# Patient Record
Sex: FEMALE | Race: WHITE
[De-identification: names, ages, dates, MRNs, and addresses within clinical notes are randomized per-mention and may not be internally consistent; named-entity substitution may affect disease eponyms.]

---

## 2017-05-12 ENCOUNTER — APPOINTMENT (OUTPATIENT)
Dept: ELECTROPHYSIOLOGY | Facility: CLINIC | Age: 67
End: 2017-05-12

## 2020-11-29 ENCOUNTER — TRANSCRIPTION ENCOUNTER (OUTPATIENT)
Age: 70
End: 2020-11-29

## 2021-02-16 ENCOUNTER — NON-APPOINTMENT (OUTPATIENT)
Age: 71
End: 2021-02-16

## 2021-04-29 ENCOUNTER — EMERGENCY (EMERGENCY)
Facility: HOSPITAL | Age: 71
LOS: 0 days | Discharge: HOME | End: 2021-04-29
Attending: EMERGENCY MEDICINE
Payer: COMMERCIAL

## 2021-04-29 VITALS
HEART RATE: 64 BPM | OXYGEN SATURATION: 100 % | DIASTOLIC BLOOD PRESSURE: 75 MMHG | SYSTOLIC BLOOD PRESSURE: 111 MMHG | TEMPERATURE: 98 F | RESPIRATION RATE: 21 BRPM

## 2021-04-29 VITALS
HEIGHT: 66 IN | TEMPERATURE: 98 F | DIASTOLIC BLOOD PRESSURE: 88 MMHG | SYSTOLIC BLOOD PRESSURE: 156 MMHG | HEART RATE: 79 BPM | OXYGEN SATURATION: 100 % | RESPIRATION RATE: 28 BRPM | WEIGHT: 182.98 LBS

## 2021-04-29 DIAGNOSIS — Z95.0 PRESENCE OF CARDIAC PACEMAKER: ICD-10-CM

## 2021-04-29 DIAGNOSIS — E78.5 HYPERLIPIDEMIA, UNSPECIFIED: ICD-10-CM

## 2021-04-29 DIAGNOSIS — I50.9 HEART FAILURE, UNSPECIFIED: ICD-10-CM

## 2021-04-29 DIAGNOSIS — Z86.16 PERSONAL HISTORY OF COVID-19: ICD-10-CM

## 2021-04-29 DIAGNOSIS — R06.02 SHORTNESS OF BREATH: ICD-10-CM

## 2021-04-29 DIAGNOSIS — I11.0 HYPERTENSIVE HEART DISEASE WITH HEART FAILURE: ICD-10-CM

## 2021-04-29 DIAGNOSIS — R05 COUGH: ICD-10-CM

## 2021-04-29 LAB
ALBUMIN SERPL ELPH-MCNC: 3.6 G/DL — SIGNIFICANT CHANGE UP (ref 3.3–5)
ALP SERPL-CCNC: 119 U/L — SIGNIFICANT CHANGE UP (ref 40–120)
ALT FLD-CCNC: 32 U/L — SIGNIFICANT CHANGE UP (ref 12–78)
ANION GAP SERPL CALC-SCNC: 6 MMOL/L — SIGNIFICANT CHANGE UP (ref 5–17)
AST SERPL-CCNC: 32 U/L — SIGNIFICANT CHANGE UP (ref 15–37)
BASOPHILS # BLD AUTO: 0.05 K/UL — SIGNIFICANT CHANGE UP (ref 0–0.2)
BASOPHILS NFR BLD AUTO: 1 % — SIGNIFICANT CHANGE UP (ref 0–2)
BILIRUB SERPL-MCNC: 0.9 MG/DL — SIGNIFICANT CHANGE UP (ref 0.2–1.2)
BUN SERPL-MCNC: 19 MG/DL — SIGNIFICANT CHANGE UP (ref 7–23)
CALCIUM SERPL-MCNC: 9.6 MG/DL — SIGNIFICANT CHANGE UP (ref 8.5–10.1)
CHLORIDE SERPL-SCNC: 109 MMOL/L — HIGH (ref 96–108)
CO2 SERPL-SCNC: 26 MMOL/L — SIGNIFICANT CHANGE UP (ref 22–31)
CREAT SERPL-MCNC: 1.26 MG/DL — SIGNIFICANT CHANGE UP (ref 0.5–1.3)
EOSINOPHIL # BLD AUTO: 0.12 K/UL — SIGNIFICANT CHANGE UP (ref 0–0.5)
EOSINOPHIL NFR BLD AUTO: 2.5 % — SIGNIFICANT CHANGE UP (ref 0–6)
GLUCOSE SERPL-MCNC: 110 MG/DL — HIGH (ref 70–99)
HCT VFR BLD CALC: 37.5 % — SIGNIFICANT CHANGE UP (ref 34.5–45)
HGB BLD-MCNC: 12.5 G/DL — SIGNIFICANT CHANGE UP (ref 11.5–15.5)
IMM GRANULOCYTES NFR BLD AUTO: 0.2 % — SIGNIFICANT CHANGE UP (ref 0–1.5)
LYMPHOCYTES # BLD AUTO: 1.25 K/UL — SIGNIFICANT CHANGE UP (ref 1–3.3)
LYMPHOCYTES # BLD AUTO: 25.9 % — SIGNIFICANT CHANGE UP (ref 13–44)
MAGNESIUM SERPL-MCNC: 2 MG/DL — SIGNIFICANT CHANGE UP (ref 1.6–2.6)
MCHC RBC-ENTMCNC: 29.5 PG — SIGNIFICANT CHANGE UP (ref 27–34)
MCHC RBC-ENTMCNC: 33.3 GM/DL — SIGNIFICANT CHANGE UP (ref 32–36)
MCV RBC AUTO: 88.4 FL — SIGNIFICANT CHANGE UP (ref 80–100)
MONOCYTES # BLD AUTO: 0.36 K/UL — SIGNIFICANT CHANGE UP (ref 0–0.9)
MONOCYTES NFR BLD AUTO: 7.5 % — SIGNIFICANT CHANGE UP (ref 2–14)
NEUTROPHILS # BLD AUTO: 3.04 K/UL — SIGNIFICANT CHANGE UP (ref 1.8–7.4)
NEUTROPHILS NFR BLD AUTO: 62.9 % — SIGNIFICANT CHANGE UP (ref 43–77)
NT-PROBNP SERPL-SCNC: 618 PG/ML — HIGH (ref 0–125)
PLATELET # BLD AUTO: 204 K/UL — SIGNIFICANT CHANGE UP (ref 150–400)
POTASSIUM SERPL-MCNC: 3.9 MMOL/L — SIGNIFICANT CHANGE UP (ref 3.5–5.3)
POTASSIUM SERPL-SCNC: 3.9 MMOL/L — SIGNIFICANT CHANGE UP (ref 3.5–5.3)
PROT SERPL-MCNC: 7.2 GM/DL — SIGNIFICANT CHANGE UP (ref 6–8.3)
RAPID RVP RESULT: SIGNIFICANT CHANGE UP
RBC # BLD: 4.24 M/UL — SIGNIFICANT CHANGE UP (ref 3.8–5.2)
RBC # FLD: 13 % — SIGNIFICANT CHANGE UP (ref 10.3–14.5)
SARS-COV-2 RNA SPEC QL NAA+PROBE: SIGNIFICANT CHANGE UP
SODIUM SERPL-SCNC: 141 MMOL/L — SIGNIFICANT CHANGE UP (ref 135–145)
TROPONIN I SERPL-MCNC: 0.04 NG/ML — SIGNIFICANT CHANGE UP (ref 0.01–0.04)
TROPONIN I SERPL-MCNC: <0.015 NG/ML — SIGNIFICANT CHANGE UP (ref 0.01–0.04)
WBC # BLD: 4.83 K/UL — SIGNIFICANT CHANGE UP (ref 3.8–10.5)
WBC # FLD AUTO: 4.83 K/UL — SIGNIFICANT CHANGE UP (ref 3.8–10.5)

## 2021-04-29 PROCEDURE — 93005 ELECTROCARDIOGRAM TRACING: CPT

## 2021-04-29 PROCEDURE — 71045 X-RAY EXAM CHEST 1 VIEW: CPT | Mod: 26

## 2021-04-29 PROCEDURE — 83880 ASSAY OF NATRIURETIC PEPTIDE: CPT

## 2021-04-29 PROCEDURE — 83735 ASSAY OF MAGNESIUM: CPT

## 2021-04-29 PROCEDURE — 80053 COMPREHEN METABOLIC PANEL: CPT

## 2021-04-29 PROCEDURE — 96374 THER/PROPH/DIAG INJ IV PUSH: CPT

## 2021-04-29 PROCEDURE — 99285 EMERGENCY DEPT VISIT HI MDM: CPT

## 2021-04-29 PROCEDURE — 85025 COMPLETE CBC W/AUTO DIFF WBC: CPT

## 2021-04-29 PROCEDURE — 84484 ASSAY OF TROPONIN QUANT: CPT

## 2021-04-29 PROCEDURE — 93010 ELECTROCARDIOGRAM REPORT: CPT

## 2021-04-29 PROCEDURE — 99284 EMERGENCY DEPT VISIT MOD MDM: CPT | Mod: 25

## 2021-04-29 PROCEDURE — 71045 X-RAY EXAM CHEST 1 VIEW: CPT

## 2021-04-29 PROCEDURE — 36415 COLL VENOUS BLD VENIPUNCTURE: CPT

## 2021-04-29 PROCEDURE — 0225U NFCT DS DNA&RNA 21 SARSCOV2: CPT

## 2021-04-29 RX ORDER — FUROSEMIDE 40 MG
40 TABLET ORAL ONCE
Refills: 0 | Status: COMPLETED | OUTPATIENT
Start: 2021-04-29 | End: 2021-04-29

## 2021-04-29 RX ADMIN — Medication 40 MILLIGRAM(S): at 11:07

## 2021-04-29 NOTE — ED PROVIDER NOTE - CARE PROVIDER_API CALL
Paul Dodd J  CARDIOVASCULAR DISEASE  [address], [address]  [place] [address]  Phone: [phone]  Fax: [phone]  Follow Up Time: 1-3 Days

## 2021-04-29 NOTE — ED PROVIDER NOTE - OBJECTIVE STATEMENT
71 y/o female with a PMHx of COVID in December 2020 not requiring hospitalization, CHF, HTN, HLD, pacemaker, presents to the ED BIBA from home c/o SOB starting this morning. Pt on Torsemide 20mg, 325mg ASA and Plavix. Pt compliant with meds and took Torsemide this morning. Reports she was in [place] yesterday and had pretzels. +cough. Denies CP, fevers. No other complaints at this time. 69 y/o female with a PMHx of COVID in December 2020 not requiring hospitalization, CHF, HTN, HLD, pacemaker, presents to the ED BIBA from home c/o SOB starting this morning. Pt on Torsemide 20mg, 325mg ASA and Plavix. Pt compliant with meds and took Torsemide this morning. Reports she was in [place] yesterday and had pretzels. +cough. Denies CP, fevers. Received second COVID vaccine 2 weeks ago. No other complaints at this time. PCP/Cardio: Dr. Dodd.

## 2021-04-29 NOTE — ED PROVIDER NOTE - PMH
CHF (congestive heart failure)    COVID-19    HLD (hyperlipidemia)    HTN (hypertension)    Pacemaker

## 2021-04-29 NOTE — ED PROVIDER NOTE - NSFOLLOWUPINSTRUCTIONS_ED_ALL_ED_FT
Congestive Heart Failure (CHF)    Congestive heart failure is a chronic condition in which the heart has trouble pumping blood. In some cases of heart failure, fluid may back up into your lungs or you may have swelling (edema) in your lower legs. There are many causes of heart failure including high blood pressure, coronary artery disease, abnormal heart valves, heart muscle disease, lung disease, diabetes, etc. Symptoms include shortness of breath with activity or when lying flat, cough, swelling of the legs, fatigue, or increased urination during the night.     Treatment is aimed at managing the symptoms of heart failure and may include lifestyle changes, medications, or surgical procedures. Take medicines only as directed by your health care provider and do not stop unless instructed to do so. Eat heart-healthy foods with low or no trans/saturated fats, cholesterol and salt. Weigh yourself every day for early recognition of fluid accumulation.    SEEK IMMEDIATE MEDICAL CARE IF YOU HAVE ANY OF THE FOLLOWING SYMPTOMS: shortness of breath, change in mental status, chest pain, lightheadedness/dizziness/fainting, or worsening of symptoms including not being able to conduct normal physical activity.

## 2021-04-29 NOTE — ED PROVIDER NOTE - PATIENT PORTAL LINK FT
You can access the FollowMyHealth Patient Portal offered by [facility] by registering at the following website: [email or web address]. By joining [facility] FollowMyHealth portal, you will also be able to view your health information using other applications (apps) compatible with our system.

## 2021-04-29 NOTE — ED ADULT TRIAGE NOTE - CHIEF COMPLAINT QUOTE
Pt BIBA with report of SOB at home. Pt reports hx of CHF. Tachypneic with O2 sat 100% on NRB mask. RN notified and pt to main 17 for further evaluation.

## 2021-04-29 NOTE — ED PROVIDER NOTE - CLINICAL SUMMARY MEDICAL DECISION MAKING FREE TEXT BOX
Suspect CHF exacerbation. Plan: cardiac workup including BNP, RVP, give extra dose of diuretic, consult Dr. Dodd and reassess.

## 2021-04-29 NOTE — ED ADULT NURSE NOTE - OBJECTIVE STATEMENT
pt presents to ed via ems from home for progressively worsening SOB x 1 day. pt has hx of chf on torsemide. pt reports having covid in december and is now fully vaccinated from covid. pt initially on non rebreather, titrated down to 3L nasal canula with good results. bp normotensive. ekg done , placed on monitor. pt a&ox4, no other complaints

## 2021-11-12 ENCOUNTER — INPATIENT (INPATIENT)
Facility: HOSPITAL | Age: 71
LOS: 1 days | Discharge: HOME | DRG: 309 | End: 2021-11-14
Attending: HOSPITALIST | Admitting: INTERNAL MEDICINE
Payer: COMMERCIAL

## 2021-11-12 VITALS — HEIGHT: 66 IN | WEIGHT: 179.9 LBS

## 2021-11-12 DIAGNOSIS — E78.5 HYPERLIPIDEMIA, UNSPECIFIED: ICD-10-CM

## 2021-11-12 DIAGNOSIS — Z79.02 LONG TERM (CURRENT) USE OF ANTITHROMBOTICS/ANTIPLATELETS: ICD-10-CM

## 2021-11-12 DIAGNOSIS — I48.91 UNSPECIFIED ATRIAL FIBRILLATION: ICD-10-CM

## 2021-11-12 DIAGNOSIS — I11.0 HYPERTENSIVE HEART DISEASE WITH HEART FAILURE: ICD-10-CM

## 2021-11-12 DIAGNOSIS — Z91.018 ALLERGY TO OTHER FOODS: ICD-10-CM

## 2021-11-12 DIAGNOSIS — I25.10 ATHEROSCLEROTIC HEART DISEASE OF NATIVE CORONARY ARTERY WITHOUT ANGINA PECTORIS: ICD-10-CM

## 2021-11-12 DIAGNOSIS — Z79.82 LONG TERM (CURRENT) USE OF ASPIRIN: ICD-10-CM

## 2021-11-12 DIAGNOSIS — Z95.5 PRESENCE OF CORONARY ANGIOPLASTY IMPLANT AND GRAFT: ICD-10-CM

## 2021-11-12 DIAGNOSIS — Z95.810 PRESENCE OF AUTOMATIC (IMPLANTABLE) CARDIAC DEFIBRILLATOR: ICD-10-CM

## 2021-11-12 DIAGNOSIS — I50.22 CHRONIC SYSTOLIC (CONGESTIVE) HEART FAILURE: ICD-10-CM

## 2021-11-12 DIAGNOSIS — I25.2 OLD MYOCARDIAL INFARCTION: ICD-10-CM

## 2021-11-12 DIAGNOSIS — Z86.16 PERSONAL HISTORY OF COVID-19: ICD-10-CM

## 2021-11-12 LAB
APPEARANCE UR: CLEAR — SIGNIFICANT CHANGE UP
BASOPHILS # BLD AUTO: 0.07 K/UL — SIGNIFICANT CHANGE UP (ref 0–0.2)
BASOPHILS NFR BLD AUTO: 1.2 % — SIGNIFICANT CHANGE UP (ref 0–2)
BILIRUB UR-MCNC: NEGATIVE — SIGNIFICANT CHANGE UP
COLOR SPEC: YELLOW — SIGNIFICANT CHANGE UP
DIFF PNL FLD: NEGATIVE — SIGNIFICANT CHANGE UP
EOSINOPHIL # BLD AUTO: 0.11 K/UL — SIGNIFICANT CHANGE UP (ref 0–0.5)
EOSINOPHIL NFR BLD AUTO: 1.9 % — SIGNIFICANT CHANGE UP (ref 0–6)
GLUCOSE UR QL: NEGATIVE MG/DL — SIGNIFICANT CHANGE UP
HCT VFR BLD CALC: 36.7 % — SIGNIFICANT CHANGE UP (ref 34.5–45)
HGB BLD-MCNC: 12.3 G/DL — SIGNIFICANT CHANGE UP (ref 11.5–15.5)
IMM GRANULOCYTES NFR BLD AUTO: 0.2 % — SIGNIFICANT CHANGE UP (ref 0–1.5)
KETONES UR-MCNC: NEGATIVE — SIGNIFICANT CHANGE UP
LEUKOCYTE ESTERASE UR-ACNC: NEGATIVE — SIGNIFICANT CHANGE UP
LYMPHOCYTES # BLD AUTO: 1.26 K/UL — SIGNIFICANT CHANGE UP (ref 1–3.3)
LYMPHOCYTES # BLD AUTO: 21.5 % — SIGNIFICANT CHANGE UP (ref 13–44)
MCHC RBC-ENTMCNC: 30.8 PG — SIGNIFICANT CHANGE UP (ref 27–34)
MCHC RBC-ENTMCNC: 33.5 GM/DL — SIGNIFICANT CHANGE UP (ref 32–36)
MCV RBC AUTO: 92 FL — SIGNIFICANT CHANGE UP (ref 80–100)
MONOCYTES # BLD AUTO: 0.44 K/UL — SIGNIFICANT CHANGE UP (ref 0–0.9)
MONOCYTES NFR BLD AUTO: 7.5 % — SIGNIFICANT CHANGE UP (ref 2–14)
NEUTROPHILS # BLD AUTO: 3.96 K/UL — SIGNIFICANT CHANGE UP (ref 1.8–7.4)
NEUTROPHILS NFR BLD AUTO: 67.7 % — SIGNIFICANT CHANGE UP (ref 43–77)
NITRITE UR-MCNC: NEGATIVE — SIGNIFICANT CHANGE UP
PH UR: 5 — SIGNIFICANT CHANGE UP (ref 5–8)
PLATELET # BLD AUTO: 193 K/UL — SIGNIFICANT CHANGE UP (ref 150–400)
PROT UR-MCNC: NEGATIVE MG/DL — SIGNIFICANT CHANGE UP
RBC # BLD: 3.99 M/UL — SIGNIFICANT CHANGE UP (ref 3.8–5.2)
RBC # FLD: 13.4 % — SIGNIFICANT CHANGE UP (ref 10.3–14.5)
SP GR SPEC: 1 — LOW (ref 1.01–1.02)
UROBILINOGEN FLD QL: NEGATIVE MG/DL — SIGNIFICANT CHANGE UP
WBC # BLD: 5.85 K/UL — SIGNIFICANT CHANGE UP (ref 3.8–10.5)
WBC # FLD AUTO: 5.85 K/UL — SIGNIFICANT CHANGE UP (ref 3.8–10.5)

## 2021-11-12 PROCEDURE — 84443 ASSAY THYROID STIM HORMONE: CPT

## 2021-11-12 PROCEDURE — 86769 SARS-COV-2 COVID-19 ANTIBODY: CPT

## 2021-11-12 PROCEDURE — 99223 1ST HOSP IP/OBS HIGH 75: CPT

## 2021-11-12 PROCEDURE — 36415 COLL VENOUS BLD VENIPUNCTURE: CPT

## 2021-11-12 PROCEDURE — 83735 ASSAY OF MAGNESIUM: CPT

## 2021-11-12 PROCEDURE — 71045 X-RAY EXAM CHEST 1 VIEW: CPT | Mod: 26

## 2021-11-12 PROCEDURE — 99291 CRITICAL CARE FIRST HOUR: CPT

## 2021-11-12 PROCEDURE — 83880 ASSAY OF NATRIURETIC PEPTIDE: CPT

## 2021-11-12 PROCEDURE — 93306 TTE W/DOPPLER COMPLETE: CPT

## 2021-11-12 PROCEDURE — G0378: CPT

## 2021-11-12 PROCEDURE — 80048 BASIC METABOLIC PNL TOTAL CA: CPT

## 2021-11-12 PROCEDURE — 0225U NFCT DS DNA&RNA 21 SARSCOV2: CPT

## 2021-11-12 PROCEDURE — 84484 ASSAY OF TROPONIN QUANT: CPT

## 2021-11-12 PROCEDURE — 86803 HEPATITIS C AB TEST: CPT

## 2021-11-12 PROCEDURE — 93010 ELECTROCARDIOGRAM REPORT: CPT

## 2021-11-12 RX ORDER — DILTIAZEM HCL 120 MG
10 CAPSULE, EXT RELEASE 24 HR ORAL ONCE
Refills: 0 | Status: COMPLETED | OUTPATIENT
Start: 2021-11-12 | End: 2021-11-12

## 2021-11-12 RX ORDER — POTASSIUM CHLORIDE 20 MEQ
40 PACKET (EA) ORAL ONCE
Refills: 0 | Status: COMPLETED | OUTPATIENT
Start: 2021-11-12 | End: 2021-11-12

## 2021-11-12 RX ORDER — ASPIRIN/CALCIUM CARB/MAGNESIUM 324 MG
162 TABLET ORAL ONCE
Refills: 0 | Status: COMPLETED | OUTPATIENT
Start: 2021-11-12 | End: 2021-11-12

## 2021-11-12 RX ORDER — DILTIAZEM HCL 120 MG
30 CAPSULE, EXT RELEASE 24 HR ORAL ONCE
Refills: 0 | Status: COMPLETED | OUTPATIENT
Start: 2021-11-12 | End: 2021-11-12

## 2021-11-12 RX ADMIN — Medication 30 MILLIGRAM(S): at 22:45

## 2021-11-12 RX ADMIN — Medication 10 MILLIGRAM(S): at 21:41

## 2021-11-12 RX ADMIN — Medication 162 MILLIGRAM(S): at 21:36

## 2021-11-12 NOTE — PHARMACOTHERAPY INTERVENTION NOTE - COMMENTS
Medication History Complete. Medications and allergies reviewed with patient and compared to DrFirst. Medication related question answered.

## 2021-11-12 NOTE — H&P ADULT - NSICDXPASTSURGICALHX_GEN_ALL_CORE_FT
PAST SURGICAL HISTORY:  AICD (automatic cardioverter/defibrillator) present     Stented coronary artery

## 2021-11-12 NOTE — ED STATDOCS - PROGRESS NOTE DETAILS
Kyle GAITAN for ED attending Dr. Wilson: 70 y/o female with PMHx of CHF, HTN, HLD, defibrillator, cardiac stent x1 presents to ED for palpitations since this morning that have been intermittent. Denies dizziness, chest pain, SOB. On Plavix. Cardiologist: Dr. Dodd. Will send pt to main ED for further evaluation, pt with abnormal EKG. Kyle GAITAN for ED attending Dr. Wilson: 72 y/o female with PMHx of CHF, HTN, HLD, defibrillator, cardiac stent x1 presents to ED for palpitations since this morning that have been intermittent. Denies dizziness, chest pain, SOB. On Plavix. Cardiologist: Dr. Dodd. Will send pt to main ED for further evaluation, pt with abnormal EKG, new onset Afib.

## 2021-11-12 NOTE — ED STATDOCS - HIV OFFER
----- Message from Lb Price MD sent at 4/9/2018 10:32 AM EDT -----  She needs a colposcopy for ASCUS HPV positive  ----- Message -----  From: Davina Wheeler MA  Sent: 4/3/2018  10:12 AM  To: Lb Price MD         Previously Declined (within the last year)

## 2021-11-12 NOTE — H&P ADULT - ASSESSMENT
70 y/o F PMHx significant for CAD, MI in 2003, s/p PCI with stent placement, CHF, s/p AICD placement, Hypertension Hyperlipidemia, and COVID-19 in December 2020 not requiring hospitalization, presents to  for further evaluation and management of c/o ongoing intermittent palpitations which began this morning. The patient states the her symptoms of palpitations early in the morning which resolved without intervention and recurred this afternoon prompting her visit to . The patient denies any associated chest pain, shortness of breath, or dizziness. In the ED the patient was found to be in new onset atrial fibrillation with rapid ventricular response. The patient admits to medication compliance. 72 y/o F PMHx significant for CAD, MI in 2003, s/p PCI with stent placement, CHF, s/p AICD placement, Hypertension Hyperlipidemia, and COVID-19 in December 2020 not requiring hospitalization, presents to  for further evaluation and management of c/o ongoing intermittent palpitations which began this morning. The patient states the her symptoms of palpitations early in the morning which resolved without intervention and recurred this afternoon prompting her visit to . The patient denies any associated chest pain, shortness of breath, or dizziness. In the ED the patient was found to be in new onset atrial fibrillation with rapid ventricular response. The patient admits to medication compliance.     70 y/o F PMHx significant for CAD, MI in 2003, s/p PCI with stent placement, CHF, s/p AICD placement, Hypertension Hyperlipidemia, and COVID-19 in December 2020 not requiring hospitalization, presents to  for further evaluation and management of c/o ongoing intermittent palpitations which began this morning. The patient states the her symptoms of palpitations early in the morning which resolved without intervention and recurred this afternoon prompting her visit to . The patient denies any associated chest pain, shortness of breath, or dizziness. In the ED the patient was found to be in new onset atrial fibrillation with rapid ventricular response. The patient admits to medication compliance.    #New Onset Atrial Fibrillation with Rapid Ventricular Response  ~admit to Telemetry  ~serial Arianne/EKGs  ~f/u w/ Cardiology in the am  ~given LMWH sq (weight-based q12h dosing)  ~f/u w/ 2DECHO in the am     #CAD/Hx of MI s/p PCI w/ stent placement/CHF  ~cont. DAPT (takes [id number] PO daily + Clopidogrel 75mg PO daily)  ~cont. Entresto 24-26mg po bid  ~daily weights  ~strict I/Os  ~cont. Torsemide 20mg po daily    #Hyperlipidemia  ~cont. statin therapy with Atorvastatin 80mg po qhs    # 72 y/o F PMHx significant for CAD, MI in 2003, s/p PCI with stent placement, CHF, s/p AICD placement, Hypertension Hyperlipidemia, and COVID-19 in December 2020 not requiring hospitalization, presents to  for further evaluation and management of c/o ongoing intermittent palpitations which began this morning. The patient states the her symptoms of palpitations early in the morning which resolved without intervention and recurred this afternoon prompting her visit to . The patient denies any associated chest pain, shortness of breath, or dizziness. In the ED the patient was found to be in new onset atrial fibrillation with rapid ventricular response. The patient admits to medication compliance.    #New Onset Atrial Fibrillation with Rapid Ventricular Response  ~admit to Telemetry  ~serial Arianne/EKGs  ~f/u w/ Cardiology in the am  ~given LMWH sq (weight-based q12h dosing)  ~f/u w/ 2DECHO in the am     #CAD/Hx of MI s/p PCI w/ stent placement/CHF  ~cont. DAPT (takes [id number] PO daily + Clopidogrel 75mg PO daily)  ~cont. Entresto 24-26mg po bid  ~daily weights  ~strict I/Os  ~cont. Torsemide 20mg po daily    #Hyperlipidemia  ~cont. statin therapy with Atorvastatin 80mg po qhs    #Vte ppx  ~cont. LMWH weight-based q12 dosing)

## 2021-11-12 NOTE — H&P ADULT - NSICDXPASTMEDICALHX_GEN_ALL_CORE_FT
PAST MEDICAL HISTORY:  CHF (congestive heart failure)     COVID-19     HLD (hyperlipidemia)     HTN (hypertension)     Pacemaker      PAST MEDICAL HISTORY:  CHF (congestive heart failure)     COVID-19     HLD (hyperlipidemia)     HTN (hypertension)

## 2021-11-12 NOTE — H&P ADULT - HISTORY OF PRESENT ILLNESS
72 y/o F PMHx significant for CAD, MI in 2003, s/p PCI with stent placement, CHF, s/p AICD placement, Hypertension Hyperlipidemia, and COVID-19 in December 2020 not requiring hospitalization, presents to  for further evaluation and management of c/o ongoing intermittent palpitations which began this morning. The patient states the her symptoms of palpitations early in the morning which resolved without intervention and recurred this afternoon prompting her visit to . The patient denies any associated chest pain, shortness of breath, or dizziness. In the ED the patient was found to be in new onset atrial fibrillation with rapid ventricular response. The patient admits to medication compliance. 72 y/o F PMHx significant for CAD, MI in 2003, s/p PCI with stent placement, CHF, s/p AICD placement, Hypertension Hyperlipidemia, and COVID-19 in December 2020 not requiring hospitalization, presents to  for further evaluation and management of c/o ongoing intermittent palpitations which began this morning. The patient states the her symptoms of palpitations early in the morning which resolved without intervention and recurred this afternoon prompting her visit to . The patient denies any associated chest pain, shortness of breath, or dizziness. In the ED the patient was found to be in new onset atrial fibrillation with rapid ventricular response. The patient admits to medication compliance.  Labs => K 3.3, proBNP 2511. In the ED the patient was given ASA 162mg PO x 1, Diltiazem 30mg PO x 1, and Diltiazem 10mg IVP x 1.

## 2021-11-12 NOTE — ED PROVIDER NOTE - CARDIOVASCULAR NEGATIVE STATEMENT, MLM
Prescription sent to pharmacy  
[facility] pharmacy faxed a refill request for levothyroxine (SYNTHROID, LEVOTHROID) 50 MCG tablet. Please advise if refill is approved.   
+palpitations, no chest pain and no edema.

## 2021-11-12 NOTE — ED STATDOCS - NSICDXPASTMEDICALHX_GEN_ALL_CORE_FT
PAST MEDICAL HISTORY:  CHF (congestive heart failure)     COVID-19     HLD (hyperlipidemia)     HTN (hypertension)     Pacemaker

## 2021-11-12 NOTE — ED ADULT TRIAGE NOTE - CHIEF COMPLAINT QUOTE
palpitations first started this morning, resolved, then returned this afternoon. history of MI in 2003, aicd. denies chest pain/pressure at this time.

## 2021-11-12 NOTE — ED PROVIDER NOTE - OBJECTIVE STATEMENT
72 y/o female with PMHx of CHF, HTN, HLD, defibrillator, cardiac stent x1 presents to ED for palpitations since this morning that have been intermittent. Denies dizziness, chest pain, SOB. On Plavix. Cardiologist: Dr. Dodd

## 2021-11-12 NOTE — H&P ADULT - NSHPPHYSICALEXAM_GEN_ALL_CORE
Vital Signs Last 24 Hrs  T(C): 37.1 (12 Nov 2021 20:09), Max: 37.1 (12 Nov 2021 20:09)  T(F): 98.8 (12 Nov 2021 20:09), Max: 98.8 (12 Nov 2021 20:09)  HR: 121 (12 Nov 2021 20:09) (121 - 121)  BP: 125/97 (12 Nov 2021 20:09) (125/97 - 125/97)  BP(mean): 107 (12 Nov 2021 20:09) (107 - 107)  RR: 19 (12 Nov 2021 20:09) (19 - 19)  SpO2: 97% (12 Nov 2021 20:09) (97% - 97%)

## 2021-11-13 DIAGNOSIS — Z95.5 PRESENCE OF CORONARY ANGIOPLASTY IMPLANT AND GRAFT: Chronic | ICD-10-CM

## 2021-11-13 DIAGNOSIS — Z95.810 PRESENCE OF AUTOMATIC (IMPLANTABLE) CARDIAC DEFIBRILLATOR: Chronic | ICD-10-CM

## 2021-11-13 PROBLEM — I10 ESSENTIAL (PRIMARY) HYPERTENSION: Chronic | Status: ACTIVE | Noted: 2021-04-29

## 2021-11-13 PROBLEM — I50.9 HEART FAILURE, UNSPECIFIED: Chronic | Status: ACTIVE | Noted: 2021-04-29

## 2021-11-13 PROBLEM — E78.5 HYPERLIPIDEMIA, UNSPECIFIED: Chronic | Status: ACTIVE | Noted: 2021-04-29

## 2021-11-13 PROBLEM — U07.1 COVID-19: Chronic | Status: ACTIVE | Noted: 2021-04-29

## 2021-11-13 LAB
ADD ON TEST-SPECIMEN IN LAB: SIGNIFICANT CHANGE UP
ANION GAP SERPL CALC-SCNC: 4 MMOL/L — LOW (ref 5–17)
BUN SERPL-MCNC: 15 MG/DL — SIGNIFICANT CHANGE UP (ref 7–23)
CALCIUM SERPL-MCNC: 8.7 MG/DL — SIGNIFICANT CHANGE UP (ref 8.5–10.1)
CHLORIDE SERPL-SCNC: 110 MMOL/L — HIGH (ref 96–108)
CO2 SERPL-SCNC: 29 MMOL/L — SIGNIFICANT CHANGE UP (ref 22–31)
COVID-19 NUCLEOCAPSID GAM AB INTERP: POSITIVE
COVID-19 NUCLEOCAPSID TOTAL GAM ANTIBODY RESULT: 9.55 INDEX — HIGH
COVID-19 SPIKE DOMAIN AB INTERP: POSITIVE
COVID-19 SPIKE DOMAIN ANTIBODY RESULT: >250 U/ML — HIGH
CREAT SERPL-MCNC: 1.14 MG/DL — SIGNIFICANT CHANGE UP (ref 0.5–1.3)
GLUCOSE SERPL-MCNC: 128 MG/DL — HIGH (ref 70–99)
HCV AB S/CO SERPL IA: 0.1 S/CO — SIGNIFICANT CHANGE UP (ref 0–0.99)
HCV AB SERPL-IMP: SIGNIFICANT CHANGE UP
MAGNESIUM SERPL-MCNC: 1.8 MG/DL — SIGNIFICANT CHANGE UP (ref 1.6–2.6)
POTASSIUM SERPL-MCNC: 3.9 MMOL/L — SIGNIFICANT CHANGE UP (ref 3.5–5.3)
POTASSIUM SERPL-SCNC: 3.9 MMOL/L — SIGNIFICANT CHANGE UP (ref 3.5–5.3)
RAPID RVP RESULT: SIGNIFICANT CHANGE UP
SARS-COV-2 IGG+IGM SERPL QL IA: 9.55 INDEX — HIGH
SARS-COV-2 IGG+IGM SERPL QL IA: >250 U/ML — HIGH
SARS-COV-2 IGG+IGM SERPL QL IA: POSITIVE
SARS-COV-2 IGG+IGM SERPL QL IA: POSITIVE
SARS-COV-2 RNA SPEC QL NAA+PROBE: SIGNIFICANT CHANGE UP
SODIUM SERPL-SCNC: 143 MMOL/L — SIGNIFICANT CHANGE UP (ref 135–145)
TROPONIN I, HIGH SENSITIVITY RESULT: 45.82 NG/L — SIGNIFICANT CHANGE UP
TSH SERPL-MCNC: 1.14 UU/ML — SIGNIFICANT CHANGE UP (ref 0.34–4.82)

## 2021-11-13 PROCEDURE — 93306 TTE W/DOPPLER COMPLETE: CPT | Mod: 26

## 2021-11-13 PROCEDURE — 99233 SBSQ HOSP IP/OBS HIGH 50: CPT

## 2021-11-13 RX ORDER — CARVEDILOL PHOSPHATE 80 MG/1
12.5 CAPSULE, EXTENDED RELEASE ORAL EVERY 12 HOURS
Refills: 0 | Status: DISCONTINUED | OUTPATIENT
Start: 2021-11-13 | End: 2021-11-13

## 2021-11-13 RX ORDER — ATORVASTATIN CALCIUM 80 MG/1
80 TABLET, FILM COATED ORAL AT BEDTIME
Refills: 0 | Status: DISCONTINUED | OUTPATIENT
Start: 2021-11-13 | End: 2021-11-14

## 2021-11-13 RX ORDER — LORATADINE 10 MG/1
10 TABLET ORAL DAILY
Refills: 0 | Status: DISCONTINUED | OUTPATIENT
Start: 2021-11-13 | End: 2021-11-14

## 2021-11-13 RX ORDER — POTASSIUM CHLORIDE 20 MEQ
40 PACKET (EA) ORAL ONCE
Refills: 0 | Status: COMPLETED | OUTPATIENT
Start: 2021-11-13 | End: 2021-11-13

## 2021-11-13 RX ORDER — APIXABAN 2.5 MG/1
5 TABLET, FILM COATED ORAL EVERY 12 HOURS
Refills: 0 | Status: DISCONTINUED | OUTPATIENT
Start: 2021-11-13 | End: 2021-11-14

## 2021-11-13 RX ORDER — ASCORBIC ACID 60 MG
500 TABLET,CHEWABLE ORAL DAILY
Refills: 0 | Status: DISCONTINUED | OUTPATIENT
Start: 2021-11-13 | End: 2021-11-14

## 2021-11-13 RX ORDER — CARVEDILOL PHOSPHATE 80 MG/1
6.25 CAPSULE, EXTENDED RELEASE ORAL EVERY 12 HOURS
Refills: 0 | Status: DISCONTINUED | OUTPATIENT
Start: 2021-11-13 | End: 2021-11-13

## 2021-11-13 RX ORDER — CHOLECALCIFEROL (VITAMIN D3) 125 MCG
2000 CAPSULE ORAL DAILY
Refills: 0 | Status: DISCONTINUED | OUTPATIENT
Start: 2021-11-13 | End: 2021-11-14

## 2021-11-13 RX ORDER — SACUBITRIL AND VALSARTAN 24; 26 MG/1; MG/1
1 TABLET, FILM COATED ORAL
Refills: 0 | Status: DISCONTINUED | OUTPATIENT
Start: 2021-11-13 | End: 2021-11-14

## 2021-11-13 RX ORDER — CLOPIDOGREL BISULFATE 75 MG/1
75 TABLET, FILM COATED ORAL DAILY
Refills: 0 | Status: DISCONTINUED | OUTPATIENT
Start: 2021-11-13 | End: 2021-11-14

## 2021-11-13 RX ORDER — ENOXAPARIN SODIUM 100 MG/ML
80 INJECTION SUBCUTANEOUS EVERY 12 HOURS
Refills: 0 | Status: DISCONTINUED | OUTPATIENT
Start: 2021-11-13 | End: 2021-11-13

## 2021-11-13 RX ORDER — PANTOPRAZOLE SODIUM 20 MG/1
40 TABLET, DELAYED RELEASE ORAL
Refills: 0 | Status: DISCONTINUED | OUTPATIENT
Start: 2021-11-13 | End: 2021-11-14

## 2021-11-13 RX ORDER — ASPIRIN/CALCIUM CARB/MAGNESIUM 324 MG
325 TABLET ORAL DAILY
Refills: 0 | Status: DISCONTINUED | OUTPATIENT
Start: 2021-11-13 | End: 2021-11-13

## 2021-11-13 RX ORDER — METOPROLOL TARTRATE 50 MG
50 TABLET ORAL EVERY 6 HOURS
Refills: 0 | Status: DISCONTINUED | OUTPATIENT
Start: 2021-11-13 | End: 2021-11-14

## 2021-11-13 RX ORDER — MULTIVIT-MIN/FERROUS GLUCONATE 9 MG/15 ML
1 LIQUID (ML) ORAL DAILY
Refills: 0 | Status: DISCONTINUED | OUTPATIENT
Start: 2021-11-13 | End: 2021-11-14

## 2021-11-13 RX ADMIN — Medication 325 MILLIGRAM(S): at 09:11

## 2021-11-13 RX ADMIN — LORATADINE 10 MILLIGRAM(S): 10 TABLET ORAL at 09:12

## 2021-11-13 RX ADMIN — PANTOPRAZOLE SODIUM 40 MILLIGRAM(S): 20 TABLET, DELAYED RELEASE ORAL at 06:25

## 2021-11-13 RX ADMIN — Medication 500 MILLIGRAM(S): at 09:11

## 2021-11-13 RX ADMIN — CARVEDILOL PHOSPHATE 12.5 MILLIGRAM(S): 80 CAPSULE, EXTENDED RELEASE ORAL at 02:06

## 2021-11-13 RX ADMIN — ATORVASTATIN CALCIUM 80 MILLIGRAM(S): 80 TABLET, FILM COATED ORAL at 21:26

## 2021-11-13 RX ADMIN — Medication 1 TABLET(S): at 09:12

## 2021-11-13 RX ADMIN — Medication 2000 UNIT(S): at 09:11

## 2021-11-13 RX ADMIN — APIXABAN 5 MILLIGRAM(S): 2.5 TABLET, FILM COATED ORAL at 21:26

## 2021-11-13 RX ADMIN — CARVEDILOL PHOSPHATE 12.5 MILLIGRAM(S): 80 CAPSULE, EXTENDED RELEASE ORAL at 09:11

## 2021-11-13 RX ADMIN — Medication 20 MILLIGRAM(S): at 09:12

## 2021-11-13 RX ADMIN — SACUBITRIL AND VALSARTAN 1 TABLET(S): 24; 26 TABLET, FILM COATED ORAL at 09:12

## 2021-11-13 RX ADMIN — Medication 40 MILLIEQUIVALENT(S): at 00:03

## 2021-11-13 RX ADMIN — ENOXAPARIN SODIUM 80 MILLIGRAM(S): 100 INJECTION SUBCUTANEOUS at 09:12

## 2021-11-13 RX ADMIN — SACUBITRIL AND VALSARTAN 1 TABLET(S): 24; 26 TABLET, FILM COATED ORAL at 21:27

## 2021-11-13 RX ADMIN — ENOXAPARIN SODIUM 80 MILLIGRAM(S): 100 INJECTION SUBCUTANEOUS at 02:06

## 2021-11-13 RX ADMIN — CLOPIDOGREL BISULFATE 75 MILLIGRAM(S): 75 TABLET, FILM COATED ORAL at 09:11

## 2021-11-13 RX ADMIN — Medication 40 MILLIEQUIVALENT(S): at 01:04

## 2021-11-13 NOTE — PROGRESS NOTE ADULT - ASSESSMENT
71F hx CAD, MI in 2003, s/p PCI w stent placement, CHF, s/p AICD, HTN, HLD, COVID in 12/20, p/w palpitations, found to have Afib with RVR.    #New onset Afib with RVR  -tele, rates currently <110 for most part  -trops neg  -BNP 2500  -TSH wnl  -monitor lytes  -CXR clear, on RA  -echo ordered  -metoprolol prn (rates currently controlled and BP borderline)  -add DOAC, Eliquis (on admission, was on  and Plavix, will stop ASA and continue Plavis/Eliquis)  -f/u cards recs (Tommy)  -AICD interrogation and EP consult    #CAD s/p stent, CHF, AICD, HTN, HLD  -continue Plavix, stop ASA as above  -continue statin  -continue entresto, torsemide  -coreg stopped and metoprolol prn added, will decide on BB going forward based on HR/BP  -AICD interrogation    #DVT ppx- full a/c w Eliquis    #dispo 1-2 days pending w/u and rate control  Uses [facility], [facility]

## 2021-11-13 NOTE — PATIENT PROFILE ADULT - VISION (WITH CORRECTIVE LENSES IF THE PATIENT USUALLY WEARS THEM):
wearing contacts/Partially impaired: cannot see medication labels or newsprint, but can see obstacles in path, and the surrounding layout; can count fingers at arm's length

## 2021-11-13 NOTE — PATIENT PROFILE ADULT - MONEY FOR FOOD
What Type Of Note Output Would You Prefer (Optional)?: Standard Output How Severe Is Your Skin Lesion?: mild Has Your Skin Lesion Been Treated?: not been treated Is This A New Presentation, Or A Follow-Up?: Skin Lesions no
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
## 2021-11-13 NOTE — PROGRESS NOTE ADULT - SUBJECTIVE AND OBJECTIVE BOX
HOSPITALIST ATTENDING PROGRESS NOTE    Chart and meds reviewed.  Patient seen and examined.    CC: palpitations    Subjective: Feels well today, understands need for continued inpatient stay.    All other systems reviewed and found to be negative with the exception of what has been described above.    MEDICATIONS  (STANDING):  apixaban 5 milliGRAM(s) Oral every 12 hours  ascorbic acid 500 milliGRAM(s) Oral daily  atorvastatin 80 milliGRAM(s) Oral at bedtime  cholecalciferol 2000 Unit(s) Oral daily  clopidogrel Tablet 75 milliGRAM(s) Oral daily  loratadine 10 milliGRAM(s) Oral daily  multivitamin/minerals 1 Tablet(s) Oral daily  pantoprazole    Tablet 40 milliGRAM(s) Oral before breakfast  sacubitril 24 mG/valsartan 26 mG 1 Tablet(s) Oral two times a day  torsemide 20 milliGRAM(s) Oral daily    MEDICATIONS  (PRN):  metoprolol tartrate 50 milliGRAM(s) Oral every 6 hours PRN HR>110      VITALS:  T(F): 97.8 (21 @ 20:05), Max: 98.6 (21 @ 08:00)  HR: 93 (21 @ 20:05) (79 - 94)  BP: 108/49 (21 @ 20:05) (104/75 - 118/71)  RR: 19 (21 @ 20:05) (14 - 20)  SpO2: 98% (21 @ 20:05) (98% - 100%)  Wt(kg): --    I&O's Summary      CAPILLARY BLOOD GLUCOSE          PHYSICAL EXAM:  Gen: NAD  HEENT:  pupils equal and reactive, EOMI, no oropharyngeal lesions, erythema, exudates, oral thrush  NECK:   supple, no carotid bruits, no palpable lymph nodes, no thyromegaly  CV:  +S1, +S2, irreg irregular, no murmurs or rubs  RESP:   lungs clear to auscultation bilaterally, no wheezing, rales, rhonchi, good air entry bilaterally  BREAST:  not examined  GI:  abdomen soft, non-tender, non-distended, normal BS, no bruits, no abdominal masses, no palpable masses  RECTAL:  not examined  :  not examined  MSK:   normal muscle tone, no atrophy, no rigidity, no contractions  EXT:  no clubbing, no cyanosis, no edema, no calf pain, swelling or erythema  VASCULAR:  pulses equal and symmetric in the upper and lower extremities  NEURO:  AAOX3, no focal neurological deficits, follows all commands, able to move extremities spontaneously  SKIN:  no ulcers, lesions or rashes    LABS:                            12.3   5.85  )-----------( 193      ( 2021 20:49 )             36.7     11-    143  |  110<H>  |  15  ----------------------------<  128<H>  3.9   |  29  |  1.14    Ca    8.7      2021 12:41  Mg     1.8         TPro  7.1  /  Alb  3.7  /  TBili  0.9  /  DBili  x   /  AST  24  /  ALT  30  /  AlkPhos  109  11        LIVER FUNCTIONS - ( 2021 22:02 )  Alb: 3.7 g/dL / Pro: 7.1 gm/dL / ALK PHOS: 109 U/L / ALT: 30 U/L / AST: 24 U/L / GGT: x             Urinalysis Basic - ( 2021 20:49 )    Color: Yellow / Appearance: Clear / S.005 / pH: x  Gluc: x / Ketone: Negative  / Bili: Negative / Urobili: Negative mg/dL   Blood: x / Protein: Negative mg/dL / Nitrite: Negative   Leuk Esterase: Negative / RBC: x / WBC x   Sq Epi: x / Non Sq Epi: x / Bacteria: x          Blood, Urine: Negative ( @ 20:49)    CULTURES:  Blood, Urine: Negative ( @ 20:49)  no new    Additional results/Imaging, I have personally reviewed:  CXR 21: No active pulmonary disease.    Telemetry, personally reviewed:  21: afib

## 2021-11-14 ENCOUNTER — TRANSCRIPTION ENCOUNTER (OUTPATIENT)
Age: 71
End: 2021-11-14

## 2021-11-14 VITALS — WEIGHT: 179.9 LBS | HEIGHT: 66 IN

## 2021-11-14 LAB
ANION GAP SERPL CALC-SCNC: 7 MMOL/L — SIGNIFICANT CHANGE UP (ref 5–17)
BUN SERPL-MCNC: 15 MG/DL — SIGNIFICANT CHANGE UP (ref 7–23)
CALCIUM SERPL-MCNC: 9.3 MG/DL — SIGNIFICANT CHANGE UP (ref 8.5–10.1)
CHLORIDE SERPL-SCNC: 107 MMOL/L — SIGNIFICANT CHANGE UP (ref 96–108)
CO2 SERPL-SCNC: 26 MMOL/L — SIGNIFICANT CHANGE UP (ref 22–31)
CREAT SERPL-MCNC: 1.03 MG/DL — SIGNIFICANT CHANGE UP (ref 0.5–1.3)
GLUCOSE SERPL-MCNC: 105 MG/DL — HIGH (ref 70–99)
MAGNESIUM SERPL-MCNC: 1.8 MG/DL — SIGNIFICANT CHANGE UP (ref 1.6–2.6)
NT-PROBNP SERPL-SCNC: 2600 PG/ML — HIGH (ref 0–125)
POTASSIUM SERPL-MCNC: 3.7 MMOL/L — SIGNIFICANT CHANGE UP (ref 3.5–5.3)
POTASSIUM SERPL-SCNC: 3.7 MMOL/L — SIGNIFICANT CHANGE UP (ref 3.5–5.3)
SODIUM SERPL-SCNC: 140 MMOL/L — SIGNIFICANT CHANGE UP (ref 135–145)

## 2021-11-14 PROCEDURE — 99223 1ST HOSP IP/OBS HIGH 75: CPT

## 2021-11-14 PROCEDURE — 93282 PRGRMG EVAL IMPLANTABLE DFB: CPT | Mod: 26

## 2021-11-14 PROCEDURE — 99239 HOSP IP/OBS DSCHRG MGMT >30: CPT

## 2021-11-14 RX ORDER — POTASSIUM CHLORIDE 20 MEQ
40 PACKET (EA) ORAL ONCE
Refills: 0 | Status: COMPLETED | OUTPATIENT
Start: 2021-11-14 | End: 2021-11-14

## 2021-11-14 RX ORDER — ASPIRIN/CALCIUM CARB/MAGNESIUM 324 MG
1 TABLET ORAL
Qty: 0 | Refills: 0 | DISCHARGE

## 2021-11-14 RX ORDER — METOPROLOL TARTRATE 50 MG
1 TABLET ORAL
Qty: 60 | Refills: 0
Start: 2021-11-14

## 2021-11-14 RX ORDER — CARVEDILOL PHOSPHATE 80 MG/1
1 CAPSULE, EXTENDED RELEASE ORAL
Qty: 0 | Refills: 0 | DISCHARGE

## 2021-11-14 RX ORDER — APIXABAN 2.5 MG/1
1 TABLET, FILM COATED ORAL
Qty: 60 | Refills: 0
Start: 2021-11-14

## 2021-11-14 RX ORDER — METOPROLOL TARTRATE 50 MG
25 TABLET ORAL
Refills: 0 | Status: DISCONTINUED | OUTPATIENT
Start: 2021-11-14 | End: 2021-11-14

## 2021-11-14 RX ADMIN — Medication 40 MILLIEQUIVALENT(S): at 11:26

## 2021-11-14 RX ADMIN — Medication 500 MILLIGRAM(S): at 11:27

## 2021-11-14 RX ADMIN — CLOPIDOGREL BISULFATE 75 MILLIGRAM(S): 75 TABLET, FILM COATED ORAL at 11:26

## 2021-11-14 RX ADMIN — Medication 20 MILLIGRAM(S): at 11:26

## 2021-11-14 RX ADMIN — APIXABAN 5 MILLIGRAM(S): 2.5 TABLET, FILM COATED ORAL at 11:27

## 2021-11-14 RX ADMIN — Medication 1 TABLET(S): at 11:26

## 2021-11-14 RX ADMIN — PANTOPRAZOLE SODIUM 40 MILLIGRAM(S): 20 TABLET, DELAYED RELEASE ORAL at 06:46

## 2021-11-14 RX ADMIN — Medication 2000 UNIT(S): at 11:27

## 2021-11-14 RX ADMIN — SACUBITRIL AND VALSARTAN 1 TABLET(S): 24; 26 TABLET, FILM COATED ORAL at 11:26

## 2021-11-14 RX ADMIN — LORATADINE 10 MILLIGRAM(S): 10 TABLET ORAL at 11:26

## 2021-11-14 RX ADMIN — Medication 25 MILLIGRAM(S): at 11:27

## 2021-11-14 NOTE — CONSULT NOTE ADULT - ASSESSMENT
70 y/o F PMHx significant for CAD, MI in 2003, s/p PCI with stent placement, CHF, s/p AICD MDT single chamber device since 2016, LVEF 25% mod MR (TTE 11/2020(), Hypertension Hyperlipidemia, and COVID-19 in December 2020, presents to  11/12/21 for ongoing intermittent palpitations that began same day.   -new onset Afib symptomatic, elevated chadsvasc will need eliquis 5mg bid longterm, metoprolol   -chf follow with cardiology continue gdmt as tolerated  -ICD interrogation shows Afib   -follow with EP and consider rhtythm control management as outpatient   -encouraged weight loss, exercise      Thank you for involving our service in participating in the care of this patient  70 y/o F PMHx significant for CAD, MI in 2003, s/p PCI with stent placement, CHF, s/p AICD MDT single chamber device since 2016, LVEF 25% mod MR (TTE 11/2020(), Hypertension Hyperlipidemia, and COVID-19 in December 2020, presents to  11/12/21 for ongoing intermittent palpitations that began same day.   -new onset Afib symptomatic, elevated chadsvasc will need eliquis 5mg bid longterm, metoprolol   -chf follow with cardiology continue gdmt as tolerated  -ICD interrogation shows underlying rhythm Afib some RVR episodes, no ICD shocks, tachy programmed to  bpm  bpm longer detection to avoid inappropriate shocks, RV threshold sensing impedance stable   -pt benefits from rhythm control management in setting of symptoms and chf, follow with EP and Cardiology as outpatient   -encouraged weight loss, exercise      Thank you for involving our service in participating in the care of this patient

## 2021-11-14 NOTE — DISCHARGE NOTE PROVIDER - HOSPITAL COURSE
CC: palpitations CC: palpitations  HPI and Hospital course:   71F hx CAD, MI in 2003, s/p PCI w stent placement, CHF, s/p AICD, HTN, HLD, COVID in 12/20, p/w palpitations, found to have Afib with RVR, started on Eliquis and BB changed to metoprolol. See below for problem based plan.    VITALS:  T(F): 97.7 (11-14-21 @ 07:33), Max: 97.8 (11-13-21 @ 20:05)  HR: 84 (11-14-21 @ 07:33) (84 - 93)  BP: 107/61 (11-14-21 @ 07:33) (107/61 - 108/49)  RR: 18 (11-14-21 @ 07:33) (18 - 19)  SpO2: 98% (11-14-21 @ 07:33) (98% - 98%)    PHYSICAL EXAM:  General: NAD, lying in bed  HEENT:  pupils equal and reactive, EOMI, no oropharyngeal lesions, erythema, exudates, oral thrush  NECK:   supple, no carotid bruits, no palpable lymph nodes, no thyromegaly  CV:  +S1, +S2, irreg irregular, no murmurs or rubs  RESP:   lungs clear to auscultation bilaterally, no wheezing, rales, rhonchi, good air entry bilaterally  BREAST:  not examined  GI:  abdomen soft, non-tender, non-distended, normal BS, no bruits, no abdominal masses, no palpable masses  RECTAL:  not examined  :  not examined  MSK:   normal muscle tone, no atrophy, no rigidity, no contractions  EXT:  no clubbing, no cyanosis, no edema, no calf pain, swelling or erythema  VASCULAR:  pulses equal and symmetric in the upper and lower extremities  NEURO:  AAOX3, no focal neurological deficits, follows all commands, able to move extremities spontaneously  SKIN:  no ulcers, lesions or rashes    CXR 11/12/21: No active pulmonary disease.    Echo 11/13/21:  Estimated left ventricular ejection fraction is 20-25%.  The left ventricle is normal in wall thickness. The left ventricle cavity is moderately dilated. Severe, diffuse hypokinesis of the left ventricle is present. The left atrium is moderately dilated.   Normal appearing right ventricle structure and function. A device wire is seen in the RV and RA. Fibrocalcific changes noted to the Aortic valve leaflets with preserved leaflet excursion. Fibrocalcific changes noted to the mitral valve leaflets with preserved   leaflet excursion. Moderate (2+) mitral regurgitation is present. Normal appearing tricuspid valve structure. Moderate (2+) tricuspid valve regurgitation is present. Mild pulmonary hypertension. Pulmonic valve not well seen. Trace to mild pulmonic valvular regurgitation is present.      #New onset Afib with RVR  -tele, rates currently <110 for most part  -trops neg  -BNP 2500  -TSH wnl  -monitor lytes  -CXR clear, on RA  -echo as above  -AICD interrogation shows afib  -stop coreg  -start Metoprolol 25 BID, can take extra dose for sustained HR>100  -add DOAC, Eliquis (on admission, was on  and Plavix, will stop ASA and continue Plavis/Eliquis)  -appreciate EP (Gabby) and cards (Tommy) input, for outpt f/u with both    #CAD s/p stent, CHF, AICD, HTN, HLD  -continue Plavix, stop ASA as above  -continue statin  -continue entresto, torsemide  -coreg stopped and metoprolol added  -AICD interrogation showing afib    #DVT ppx- full a/c w Eliquis    I have spent 45 min on day of d/c coordinating care.

## 2021-11-14 NOTE — DISCHARGE NOTE PROVIDER - CARE PROVIDER_API CALL
Paul Dodd  CARDIOVASCULAR DISEASE  [address], [address]  [place] [address]  Phone: [phone]  Fax: [phone]  Follow Up Time: 1 week    Manuel Pierre)  Cardiac Electrophysiology; Cardiovascular Disease; Internal Medicine  [address] [place] [address]  Phone: [phone]  Fax: [phone]  Follow Up Time: 2 weeks

## 2021-11-14 NOTE — CONSULT NOTE ADULT - ASSESSMENT
71 F with CAD s/p MI, CHF with ICD presents with new onset AF      AF- currently rate controlled- recommend discharging on 25 BID metoprolol and titrating dose as an outpatient she will start eliquis as outpatient and discontinue asa, but continue plavix-- discussed option of DCCV- will discuss further and consider scheduling electively       CAD- continue plavix and lipitor    CHF- euvolemic- continue entresto and metoprolol, torsemide     no cardiac contraindication to DC

## 2021-11-14 NOTE — DISCHARGE NOTE PROVIDER - PROVIDER TOKENS
PROVIDER:[TOKEN:[[id number]],FOLLOWUP:[1 week]],PROVIDER:[TOKEN:[[id number]],FOLLOWUP:[2 weeks]]

## 2021-11-14 NOTE — DISCHARGE NOTE PROVIDER - NSDCMRMEDTOKEN_GEN_ALL_CORE_FT
apixaban 5 mg oral tablet: 1 tab(s) orally every 12 hours  ascorbic acid 500 mg oral tablet: 1 tab(s) orally once a day  atorvastatin 80 mg oral tablet: 1 tab(s) orally once a day  Centrum Silver oral tablet: 1 tab(s) orally once a day  cholecalciferol 50 mcg (2000 intl units) oral tablet: 1 tab(s) orally once a day  clopidogrel 75 mg oral tablet: 1 tab(s) orally once a day  Entresto 24 mg-26 mg oral tablet: 1 tab(s) orally 2 times a day  Fish Oil 1000 mg oral capsule: 1 cap(s) orally once a day  metoprolol tartrate 25 mg oral tablet: 1 tab(s) orally 2 times a day  Moderna COVID-19 Vaccine  mcg/0.5 mL intramuscular suspension: 0.5 milliliter(s) intramuscular once  ****Completed as of 04/21***  mometasone 0.1% topical cream: Apply topically to affected area once a day, As Needed - for eczema  omeprazole 40 mg oral delayed release capsule: 1 cap(s) orally once a day  torsemide 20 mg oral tablet: 1 tab(s) orally once a day  ZyrTEC 10 mg oral tablet: 1 tab(s) orally once a day

## 2021-11-14 NOTE — DISCHARGE NOTE NURSING/CASE MANAGEMENT/SOCIAL WORK - PATIENT PORTAL LINK FT
You can access the FollowMyHealth Patient Portal offered by [facility] by registering at the following website: [email or web address]. By joining [employer]’s FollowMyHealth portal, you will also be able to view your health information using other applications (apps) compatible with our system.

## 2021-11-14 NOTE — DISCHARGE NOTE PROVIDER - NSDCCPCAREPLAN_GEN_ALL_CORE_FT
PRINCIPAL DISCHARGE DIAGNOSIS  Diagnosis: Atrial fibrillation with RVR  Assessment and Plan of Treatment: You were found to have abnormal heart rhythm called atrial fibrillation.  Regarding your blood thinners:  1. Start taking Eliquis 5mg twice a day.  2. Continue taking plavix.  3. STOP taking aspirin.  In addition, take metoprolol 25mg twice a day, take an additional dose if your heart rate is above 100 for a sustained period of time. Your outpatient cardiologist will adjust this medication further as needed.  Follow up with your outpatient cardiology group and with Dr. Pierre or Hua in the office.       PRINCIPAL DISCHARGE DIAGNOSIS  Diagnosis: Atrial fibrillation with RVR  Assessment and Plan of Treatment: You were found to have abnormal heart rhythm called atrial fibrillation.  Regarding your blood thinners:  1. Start taking Eliquis 5mg twice a day.  2. Continue taking plavix.  3. STOP taking aspirin.  In addition:  1. Stop taking coreg (carvedilol)  2. Start taking metoprolol 25mg twice a day, take an additional dose if your heart rate is above 100 for a sustained period of time. Your outpatient cardiologist will adjust this medication further as needed.  Follow up with your outpatient cardiology group and with Dr. Pierre or Hua in the office.

## 2021-11-14 NOTE — CONSULT NOTE ADULT - SUBJECTIVE AND OBJECTIVE BOX
CHIEF COMPLAINT: Patient is a 71y old  Female who presents with a chief complaint of Palpitations (13 Nov 2021 20:36)      HPI:  72 y/o F PMHx significant for CAD, MI in 2003, s/p PCI with stent placement, CHF, s/p AICD placement, Hypertension Hyperlipidemia, and COVID-19 in December 2020 not requiring hospitalization, presents to  for further evaluation and management of c/o ongoing intermittent palpitations which began this morning. The patient states the her symptoms of palpitations early in the morning which resolved without intervention and recurred this afternoon prompting her visit to . The patient denies any associated chest pain, shortness of breath, or dizziness. In the ED the patient was found to be in new onset atrial fibrillation with rapid ventricular response. The patient admits to medication compliance.  Labs => K 3.3, proBNP 2511. In the ED the patient was given ASA 162mg PO x 1, Diltiazem 30mg PO x 1, and Diltiazem 10mg IVP x 1. (12 Nov 2021 23:20)      PMHx: PAST MEDICAL & SURGICAL HISTORY:  COVID-19    HTN (hypertension)    HLD (hyperlipidemia)    CHF (congestive heart failure)    Stented coronary artery    AICD (automatic cardioverter/defibrillator) present          Soc Hx: no toxic habits      Allergies: Allergies    No Known Drug Allergies  strawberry (Other)    Intolerances        Vital Signs Last 24 Hrs  T(C): 36.5 (14 Nov 2021 07:33), Max: 37 (13 Nov 2021 08:00)  T(F): 97.7 (14 Nov 2021 07:33), Max: 98.6 (13 Nov 2021 08:00)  HR: 84 (14 Nov 2021 07:33) (83 - 93)  BP: 107/61 (14 Nov 2021 07:33) (107/61 - 111/53)  BP(mean): --  RR: 18 (14 Nov 2021 07:33) (18 - 19)  SpO2: 98% (14 Nov 2021 07:33) (98% - 98%)    I&O's Summary          PHYSICAL EXAM:   Constitutional: NAD, awake and alert, well-developed  HEENT: PERR, EOMI, Normal Hearing, MMM  Neck: Soft and supple, No LAD, No JVD  Respiratory: Breath sounds are clear bilaterally, No wheezing, rales or rhonchi  Cardiovascular: S1 and S2, irregular rate and rhythm, no Murmurs, gallops or rubs  Gastrointestinal: Bowel Sounds present, soft, nontender, nondistended, no guarding, no rebound  Extremities: No peripheral edema  Vascular: 2+ peripheral pulses  Neurological: A/O x 3, no focal deficits  Musculoskeletal: 5/5 strength b/l upper and lower extremities  Skin: No rashes    MEDICATIONS  (STANDING):  apixaban 5 milliGRAM(s) Oral every 12 hours  ascorbic acid 500 milliGRAM(s) Oral daily  atorvastatin 80 milliGRAM(s) Oral at bedtime  cholecalciferol 2000 Unit(s) Oral daily  clopidogrel Tablet 75 milliGRAM(s) Oral daily  loratadine 10 milliGRAM(s) Oral daily  multivitamin/minerals 1 Tablet(s) Oral daily  pantoprazole    Tablet 40 milliGRAM(s) Oral before breakfast  potassium chloride    Tablet ER 40 milliEquivalent(s) Oral once  sacubitril 24 mG/valsartan 26 mG 1 Tablet(s) Oral two times a day  torsemide 20 milliGRAM(s) Oral daily    MEDICATIONS  (PRN):  metoprolol tartrate 50 milliGRAM(s) Oral every 6 hours PRN HR>110        LABS: All Labs Reviewed:                        12.3   5.85  )-----------( 193      ( 12 Nov 2021 20:49 )             36.7     11-14    140  |  107  |  15  ----------------------------<  105<H>  3.7   |  26  |  1.03    Ca    9.3      14 Nov 2021 07:00  Mg     1.8     11-14    TPro  7.1  /  Alb  3.7  /  TBili  0.9  /  DBili  x   /  AST  24  /  ALT  30  /  AlkPhos  109  11-12          Serum Pro-Brain Natriuretic Peptide: 2600 pg/mL (11-14 @ 07:00)  Serum Pro-Brain Natriuretic Peptide: 2511 pg/mL (11-12 @ 22:02)        EKG: AF  LVH with IVCD    Telemetry: AF     ECHO:< from: TTE Echo Complete w/o Contrast w/ Doppler (11.13.21 @ 12:19) >   Summary     Estimated left ventricular ejection fraction is 20-25%.   The left ventricle is normal in wall thickness.   The left ventricle cavity is moderately dilated.   Severe, diffuse hypokinesisof the left ventricle is present.   The left atrium is moderately dilated.   Normal appearing right ventricle structure and function.   A device wire is seen in the RV and RA.   Fibrocalcific changes noted to the Aortic valve leaflets with preserved   leaflet excursion.   Fibrocalcific changes noted to the mitral valve leaflets with preserved   leaflet excursion.   Moderate (2+) mitral regurgitation is present.   Normal appearing tricuspid valve structure.   Moderate (2+) tricuspid valve regurgitation is present.   Mild pulmonary hypertension.   Pulmonic valve not well seen.   Trace to mild pulmonic valvular regurgitation is present.     Signature     ----------------------------------------------------------------   Electronically signed by Marco Luu MD(Interpreting   physician) on 11/13/2021 09:42 PM   ----------------------------------------------------------------    < end of copied text >      
Patient is a 71y old  Female who presents with a chief complaint of Palpitations (2021 07:58)      HPI:  70 y/o F PMHx significant for CAD, MI in 2003, s/p PCI with stent placement, CHF, s/p AICD MDT single chamber device since 2016, LVEF 25% mod MR (TTE 2020(), Hypertension Hyperlipidemia, and COVID-19 in 2020 not requiring hospitalization, presents to  for further evaluation and management of c/o ongoing intermittent palpitations which began this morning. The patient states the her symptoms of palpitations early in the morning which resolved without intervention and recurred this afternoon prompting her visit to . The patient denies any associated chest pain, shortness of breath, or dizziness. In the ED the patient was found to be in new onset atrial fibrillation with rapid ventricular response. The patient admits to medication compliance.  Labs => K 3.3, proBNP 2511. In the ED the patient was given ASA 162mg PO x 1, Diltiazem 30mg PO x 1, and Diltiazem 10mg IVP x 1. (2021 23:20)  Pt has bothersome palpitations during afib today feels well back to normal while in SR, denies ICD shock    21 EKG: Afib RVR to 120 bpm, IVCD    TELEMETRY: AF and spontaneous conversion to SR     PAST MEDICAL & SURGICAL HISTORY:  COVID-19    HTN (hypertension)    HLD (hyperlipidemia)    CHF (congestive heart failure)    Stented coronary artery    AICD (automatic cardioverter/defibrillator) present        MEDICATIONS  (STANDING):  apixaban 5 milliGRAM(s) Oral every 12 hours  ascorbic acid 500 milliGRAM(s) Oral daily  atorvastatin 80 milliGRAM(s) Oral at bedtime  cholecalciferol 2000 Unit(s) Oral daily  clopidogrel Tablet 75 milliGRAM(s) Oral daily  loratadine 10 milliGRAM(s) Oral daily  metoprolol tartrate 25 milliGRAM(s) Oral two times a day  multivitamin/minerals 1 Tablet(s) Oral daily  pantoprazole    Tablet 40 milliGRAM(s) Oral before breakfast  potassium chloride    Tablet ER 40 milliEquivalent(s) Oral once  sacubitril 24 mG/valsartan 26 mG 1 Tablet(s) Oral two times a day  torsemide 20 milliGRAM(s) Oral daily    MEDICATIONS  (PRN):      FAMILY HISTORY:  No pertinent family history in first degree relatives      SOCIAL HISTORY: Denies tobacco, etoh abuse or illicit drug use      REVIEW OF SYSTEMS:    CONSTITUTIONAL:  As per HPI.  HEENT:  Eyes:  No diplopia or blurred vision. ENT:  No earache, sore throat or runny nose.  CARDIOVASCULAR:  No Dsypnea/Palpitations/Dizziness/Syncope, No pressure, squeezing, strangling, tightness, heaviness or aching about the chest, neck, axilla or epigastrium.  RESPIRATORY:  No cough, shortness of breath, PND or orthopnea.  GASTROINTESTINAL:  No nausea, vomiting or diarrhea.  GENITOURINARY:  No dysuria, frequency or urgency.  MUSCULOSKELETAL:  As per HPI.  SKIN:  No change in skin, hair or nails.  NEUROLOGIC:  No paresthesias, fasciculations, seizures or weakness.  PSYCHIATRIC:  No disorder of thought or mood.  ENDOCRINE:  No heat or cold intolerance, polyuria or polydipsia.  HEMATOLOGICAL:  No easy bruising or bleedings:    T(C): 36.5 (21 @ 07:33), Max: 36.6 (21 @ 20:05)  HR: 84 (21 @ 07:33) (84 - 93)  BP: 107/61 (21 @ 07:33) (107/61 - 108/49)  RR: 18 (21 @ 07:33) (18 - 19)  SpO2: 98% (21 @ 07:33) (98% - 98%)    PHYSICAL EXAMINATION:    GENERAL APPEARANCE:  Pt. is not currently dyspneic, in no distress. Pt. is alert, oriented, and pleasant.  HEENT:  Pupils are normal and react normally. No icterus. Mucous membranes well colored.  NECK:  Supple. No lymphadenopathy. Jugular venous pressure not elevated. Carotids equal.   HEART:   regular rate and rhythm/ Afib. There are no murmurs, rubs or gallops noted  CHEST:  Chest is clear to auscultation. Normal respiratory effort.  ABDOMEN:  Soft and nontender.   EXTREMITIES:  There is no edema, warm and dry.      I&O's Summary      LABS:                        12.3   5.85  )-----------( 193      ( 2021 20:49 )             36.7         140  |  107  |  15  ----------------------------<  105<H>  3.7   |  26  |  1.03    Ca    9.3      2021 07:00  Mg     1.8         TPro  7.1  /  Alb  3.7  /  TBili  0.9  /  DBili  x   /  AST  24  /  ALT  30  /  AlkPhos  109  12    LIVER FUNCTIONS - ( 2021 22:02 )  Alb: 3.7 g/dL / Pro: 7.1 gm/dL / ALK PHOS: 109 U/L / ALT: 30 U/L / AST: 24 U/L / GGT: x                 Urinalysis Basic - ( 2021 20:49 )    Color: Yellow / Appearance: Clear / S.005 / pH: x  Gluc: x / Ketone: Negative  / Bili: Negative / Urobili: Negative mg/dL   Blood: x / Protein: Negative mg/dL / Nitrite: Negative   Leuk Esterase: Negative / RBC: x / WBC x   Sq Epi: x / Non Sq Epi: x / Bacteria: x

## 2021-11-18 NOTE — CDI QUERY NOTE - NSCDIOTHERTXTBX_GEN_ALL_CORE_HH
This patient is diagnosed with CHF.  Echo shows 20-25% EF.  BNP 2600.  Please clarify in your discharge note:    A. chronic systolic heart failure  B. Acute on chronic systolic heart failure  C. No CHF  D. Not clinically significant

## 2022-04-10 ENCOUNTER — TRANSCRIPTION ENCOUNTER (OUTPATIENT)
Age: 72
End: 2022-04-10

## 2022-04-10 ENCOUNTER — INPATIENT (INPATIENT)
Facility: HOSPITAL | Age: 72
LOS: 0 days | Discharge: HOME | DRG: 309 | End: 2022-04-11
Attending: FAMILY MEDICINE | Admitting: HOSPITALIST
Payer: COMMERCIAL

## 2022-04-10 VITALS — HEIGHT: 66 IN | WEIGHT: 175.05 LBS

## 2022-04-10 DIAGNOSIS — I48.91 UNSPECIFIED ATRIAL FIBRILLATION: ICD-10-CM

## 2022-04-10 DIAGNOSIS — Z95.5 PRESENCE OF CORONARY ANGIOPLASTY IMPLANT AND GRAFT: Chronic | ICD-10-CM

## 2022-04-10 DIAGNOSIS — Z95.810 PRESENCE OF AUTOMATIC (IMPLANTABLE) CARDIAC DEFIBRILLATOR: Chronic | ICD-10-CM

## 2022-04-10 LAB
ALBUMIN SERPL ELPH-MCNC: 3.8 G/DL — SIGNIFICANT CHANGE UP (ref 3.3–5)
ALP SERPL-CCNC: 116 U/L — SIGNIFICANT CHANGE UP (ref 40–120)
ALT FLD-CCNC: 32 U/L — SIGNIFICANT CHANGE UP (ref 12–78)
ANION GAP SERPL CALC-SCNC: 5 MMOL/L — SIGNIFICANT CHANGE UP (ref 5–17)
AST SERPL-CCNC: 29 U/L — SIGNIFICANT CHANGE UP (ref 15–37)
BASOPHILS # BLD AUTO: 0.04 K/UL — SIGNIFICANT CHANGE UP (ref 0–0.2)
BASOPHILS NFR BLD AUTO: 0.7 % — SIGNIFICANT CHANGE UP (ref 0–2)
BILIRUB SERPL-MCNC: 0.8 MG/DL — SIGNIFICANT CHANGE UP (ref 0.2–1.2)
BUN SERPL-MCNC: 17 MG/DL — SIGNIFICANT CHANGE UP (ref 7–23)
CALCIUM SERPL-MCNC: 9.2 MG/DL — SIGNIFICANT CHANGE UP (ref 8.5–10.1)
CHLORIDE SERPL-SCNC: 106 MMOL/L — SIGNIFICANT CHANGE UP (ref 96–108)
CO2 SERPL-SCNC: 30 MMOL/L — SIGNIFICANT CHANGE UP (ref 22–31)
CREAT SERPL-MCNC: 1.25 MG/DL — SIGNIFICANT CHANGE UP (ref 0.5–1.3)
EGFR: 47 ML/MIN/1.73M2 — LOW
EOSINOPHIL # BLD AUTO: 0.04 K/UL — SIGNIFICANT CHANGE UP (ref 0–0.5)
EOSINOPHIL NFR BLD AUTO: 0.7 % — SIGNIFICANT CHANGE UP (ref 0–6)
GLUCOSE SERPL-MCNC: 121 MG/DL — HIGH (ref 70–99)
HCT VFR BLD CALC: 38.2 % — SIGNIFICANT CHANGE UP (ref 34.5–45)
HGB BLD-MCNC: 12.9 G/DL — SIGNIFICANT CHANGE UP (ref 11.5–15.5)
IMM GRANULOCYTES NFR BLD AUTO: 0.2 % — SIGNIFICANT CHANGE UP (ref 0–1.5)
LYMPHOCYTES # BLD AUTO: 1.61 K/UL — SIGNIFICANT CHANGE UP (ref 1–3.3)
LYMPHOCYTES # BLD AUTO: 28.3 % — SIGNIFICANT CHANGE UP (ref 13–44)
MAGNESIUM SERPL-MCNC: 1.8 MG/DL — SIGNIFICANT CHANGE UP (ref 1.6–2.6)
MCHC RBC-ENTMCNC: 29.9 PG — SIGNIFICANT CHANGE UP (ref 27–34)
MCHC RBC-ENTMCNC: 33.8 GM/DL — SIGNIFICANT CHANGE UP (ref 32–36)
MCV RBC AUTO: 88.4 FL — SIGNIFICANT CHANGE UP (ref 80–100)
MONOCYTES # BLD AUTO: 0.48 K/UL — SIGNIFICANT CHANGE UP (ref 0–0.9)
MONOCYTES NFR BLD AUTO: 8.5 % — SIGNIFICANT CHANGE UP (ref 2–14)
NEUTROPHILS # BLD AUTO: 3.5 K/UL — SIGNIFICANT CHANGE UP (ref 1.8–7.4)
NEUTROPHILS NFR BLD AUTO: 61.6 % — SIGNIFICANT CHANGE UP (ref 43–77)
NT-PROBNP SERPL-SCNC: 1462 PG/ML — HIGH (ref 0–125)
PLATELET # BLD AUTO: 215 K/UL — SIGNIFICANT CHANGE UP (ref 150–400)
POTASSIUM SERPL-MCNC: 3.4 MMOL/L — LOW (ref 3.5–5.3)
POTASSIUM SERPL-SCNC: 3.4 MMOL/L — LOW (ref 3.5–5.3)
PROT SERPL-MCNC: 7.4 GM/DL — SIGNIFICANT CHANGE UP (ref 6–8.3)
RAPID RVP RESULT: SIGNIFICANT CHANGE UP
RBC # BLD: 4.32 M/UL — SIGNIFICANT CHANGE UP (ref 3.8–5.2)
RBC # FLD: 13 % — SIGNIFICANT CHANGE UP (ref 10.3–14.5)
SARS-COV-2 RNA SPEC QL NAA+PROBE: SIGNIFICANT CHANGE UP
SODIUM SERPL-SCNC: 141 MMOL/L — SIGNIFICANT CHANGE UP (ref 135–145)
TROPONIN I, HIGH SENSITIVITY RESULT: 52.18 NG/L — SIGNIFICANT CHANGE UP
TROPONIN I, HIGH SENSITIVITY RESULT: 62.47 NG/L — HIGH
WBC # BLD: 5.68 K/UL — SIGNIFICANT CHANGE UP (ref 3.8–10.5)
WBC # FLD AUTO: 5.68 K/UL — SIGNIFICANT CHANGE UP (ref 3.8–10.5)

## 2022-04-10 PROCEDURE — 0225U NFCT DS DNA&RNA 21 SARSCOV2: CPT

## 2022-04-10 PROCEDURE — G0378: CPT

## 2022-04-10 PROCEDURE — 99222 1ST HOSP IP/OBS MODERATE 55: CPT

## 2022-04-10 PROCEDURE — 84484 ASSAY OF TROPONIN QUANT: CPT

## 2022-04-10 PROCEDURE — 93010 ELECTROCARDIOGRAM REPORT: CPT

## 2022-04-10 PROCEDURE — 81001 URINALYSIS AUTO W/SCOPE: CPT

## 2022-04-10 PROCEDURE — 36415 COLL VENOUS BLD VENIPUNCTURE: CPT

## 2022-04-10 PROCEDURE — 83735 ASSAY OF MAGNESIUM: CPT

## 2022-04-10 PROCEDURE — 99285 EMERGENCY DEPT VISIT HI MDM: CPT

## 2022-04-10 PROCEDURE — 71045 X-RAY EXAM CHEST 1 VIEW: CPT | Mod: 26

## 2022-04-10 PROCEDURE — 80048 BASIC METABOLIC PNL TOTAL CA: CPT

## 2022-04-10 RX ORDER — PANTOPRAZOLE SODIUM 20 MG/1
40 TABLET, DELAYED RELEASE ORAL
Refills: 0 | Status: DISCONTINUED | OUTPATIENT
Start: 2022-04-10 | End: 2022-04-11

## 2022-04-10 RX ORDER — POTASSIUM CHLORIDE 20 MEQ
40 PACKET (EA) ORAL EVERY 4 HOURS
Refills: 0 | Status: COMPLETED | OUTPATIENT
Start: 2022-04-10 | End: 2022-04-11

## 2022-04-10 RX ORDER — MULTIVIT-MIN/FERROUS GLUCONATE 9 MG/15 ML
1 LIQUID (ML) ORAL
Qty: 0 | Refills: 0 | DISCHARGE

## 2022-04-10 RX ORDER — MAGNESIUM SULFATE 500 MG/ML
1 VIAL (ML) INJECTION ONCE
Refills: 0 | Status: COMPLETED | OUTPATIENT
Start: 2022-04-10 | End: 2022-04-10

## 2022-04-10 RX ORDER — ASCORBIC ACID 60 MG
1 TABLET,CHEWABLE ORAL
Qty: 0 | Refills: 0 | DISCHARGE

## 2022-04-10 RX ORDER — ATORVASTATIN CALCIUM 80 MG/1
1 TABLET, FILM COATED ORAL
Qty: 0 | Refills: 0 | DISCHARGE

## 2022-04-10 RX ORDER — CLOPIDOGREL BISULFATE 75 MG/1
1 TABLET, FILM COATED ORAL
Qty: 0 | Refills: 0 | DISCHARGE

## 2022-04-10 RX ORDER — ATORVASTATIN CALCIUM 80 MG/1
80 TABLET, FILM COATED ORAL AT BEDTIME
Refills: 0 | Status: DISCONTINUED | OUTPATIENT
Start: 2022-04-10 | End: 2022-04-11

## 2022-04-10 RX ORDER — CHOLECALCIFEROL (VITAMIN D3) 125 MCG
1 CAPSULE ORAL
Qty: 0 | Refills: 0 | DISCHARGE

## 2022-04-10 RX ORDER — OMEPRAZOLE 10 MG/1
1 CAPSULE, DELAYED RELEASE ORAL
Qty: 0 | Refills: 0 | DISCHARGE

## 2022-04-10 RX ORDER — LORATADINE 10 MG/1
10 TABLET ORAL DAILY
Refills: 0 | Status: DISCONTINUED | OUTPATIENT
Start: 2022-04-10 | End: 2022-04-11

## 2022-04-10 RX ORDER — SACUBITRIL AND VALSARTAN 24; 26 MG/1; MG/1
1 TABLET, FILM COATED ORAL
Refills: 0 | Status: DISCONTINUED | OUTPATIENT
Start: 2022-04-10 | End: 2022-04-11

## 2022-04-10 RX ORDER — MOMETASONE FUROATE 1 MG/G
1 CREAM TOPICAL
Qty: 0 | Refills: 0 | DISCHARGE

## 2022-04-10 RX ORDER — CLOPIDOGREL BISULFATE 75 MG/1
75 TABLET, FILM COATED ORAL DAILY
Refills: 0 | Status: DISCONTINUED | OUTPATIENT
Start: 2022-04-11 | End: 2022-04-11

## 2022-04-10 RX ORDER — CETIRIZINE HYDROCHLORIDE 10 MG/1
1 TABLET ORAL
Qty: 0 | Refills: 0 | DISCHARGE

## 2022-04-10 RX ORDER — METOPROLOL TARTRATE 50 MG
25 TABLET ORAL
Refills: 0 | Status: DISCONTINUED | OUTPATIENT
Start: 2022-04-10 | End: 2022-04-11

## 2022-04-10 RX ORDER — SACUBITRIL AND VALSARTAN 24; 26 MG/1; MG/1
1 TABLET, FILM COATED ORAL
Qty: 0 | Refills: 0 | DISCHARGE

## 2022-04-10 RX ORDER — APIXABAN 2.5 MG/1
5 TABLET, FILM COATED ORAL EVERY 12 HOURS
Refills: 0 | Status: DISCONTINUED | OUTPATIENT
Start: 2022-04-10 | End: 2022-04-11

## 2022-04-10 RX ADMIN — Medication 100 GRAM(S): at 23:54

## 2022-04-10 RX ADMIN — SACUBITRIL AND VALSARTAN 1 TABLET(S): 24; 26 TABLET, FILM COATED ORAL at 23:54

## 2022-04-10 RX ADMIN — ATORVASTATIN CALCIUM 80 MILLIGRAM(S): 80 TABLET, FILM COATED ORAL at 22:34

## 2022-04-10 RX ADMIN — Medication 40 MILLIEQUIVALENT(S): at 22:35

## 2022-04-10 RX ADMIN — Medication 25 MILLIGRAM(S): at 22:35

## 2022-04-10 RX ADMIN — APIXABAN 5 MILLIGRAM(S): 2.5 TABLET, FILM COATED ORAL at 22:34

## 2022-04-10 NOTE — ED PROVIDER NOTE - CARE PLAN
1 Principal Discharge DX:	Atrial fibrillation with rapid ventricular response  Secondary Diagnosis:	Troponin I above reference range

## 2022-04-10 NOTE — PATIENT PROFILE ADULT - NSTRANSFERBELONGINGSDISPO_GEN_A_NUR
"..  Chief Complaint   Patient presents with   • Syncope     x's 2 days ago at work. Saw pcp and was sent to [facility] from  and Minimal linear lingular atelectasis was seen on xray   • Palpitations     x's 1 month.    ../92   Pulse (!) 127   Temp 36.5 °C (97.7 °F) (Temporal)   Resp 18   Ht 1.727 m (5' 8\")   Wt 81.6 kg (179 lb 14.3 oz)   SpO2 98%    ..Explained triage process, to waiting room. Asked to inform RN if questions or concerns arise.   " with patient

## 2022-04-10 NOTE — ED PROVIDER NOTE - CHPI ED SYMPTOMS NEG
no lightheadedness/dizziness/no chest pain/no nausea/no shortness of breath/no syncope/no diaphoresis

## 2022-04-10 NOTE — ED PROVIDER NOTE - MUSCULOSKELETAL, MLM
Spine appears normal, range of motion is not limited, no muscle or joint tenderness. RAMIREZ x4, no focal tenderness or swelling.

## 2022-04-10 NOTE — ED ADULT NURSE NOTE - OBJECTIVE STATEMENT
Pt reports that she woke this am feeling racing irregular heart rate. Denies recent symptoms. Denies f/c/c, CP, or SOB. Pt reports she used her heart rhythm machine and it told her she was having appearance of afib with HR in the 120's.  Pt cardiac monitor, EKG, and labs. Will medicate as ordered. Pt hx of acid and on anticoagulants.

## 2022-04-10 NOTE — H&P ADULT - HISTORY OF PRESENT ILLNESS
71/F with PMHx of A-Fib on Eliquis, CAD, MI in 2003, s/p PCI w stent placement, CHF, s/p AICD, HTN, HLD, COVID in 12/20 presents to the ED BIB spouse c/o palpitations. Pt states when she got up this morning at about 7 AM, she felt palpitations. Pt describes palpitations as heart racing and pounding sensation. Pt states she took her morning medications which partially resolved symptoms, but not completely. Pt last ate at about 8 AM this morning. No c/o cp/sob/n/v.     Trop mildly elevated 62.

## 2022-04-10 NOTE — DISCHARGE NOTE NURSING/CASE MANAGEMENT/SOCIAL WORK - NSDCPEFALRISK_GEN_ALL_CORE
For information on Fall & Injury Prevention, visit: [email or web address] OR  [email or web address] OR  https://www.cdc.gov/steadi/patient.html

## 2022-04-10 NOTE — ED PROVIDER NOTE - OBJECTIVE STATEMENT
70 y/o female with PMHx of A-Fib (on Xarelto), CAD, MI in 2003, s/p PCI w stent placement, CHF, s/p AICD, HTN, HLD, COVID in 12/20 presents to the ED BIB spouse c/o palpitations. Pt states when she got up this morning at about 7 AM, she felt palpitations. Pt describes as palpitations as heart racing and pounding sensation. Pt states she took her morning medications which partially resolved symptoms, but not completely. Pt last ate at about 8 AM this morning. Denies diaphoresis, chest pain, lightheadedness/dizziness, syncope, SOB, nausea, or any other symptoms. PCP: Dr. Paul Dodd. 70 y/o female with PMHx of A-Fib (on Xarelto), CAD, MI in 2003, s/p PCI w stent placement, CHF, s/p AICD, HTN, HLD, COVID in 12/20 presents to the ED BIB spouse c/o palpitations. Pt states when she got up this morning at about 7 AM, she felt palpitations. Pt describes as palpitations as heart racing and pounding sensation. Pt states she took her morning medications which partially improved symptoms, but not complete resolution. Pt last ate at about 8 AM this morning. Denies diaphoresis, chest pain, lightheadedness/dizziness, syncope, SOB, nausea, or any other symptoms.   PCP/Cardio: Dr. Paul Dodd.

## 2022-04-10 NOTE — H&P ADULT - NSHPLABSRESULTS_GEN_ALL_CORE
All Labs/EKG/Radiology/Meds reviewed by me.     Lab Results:  CBC  CBC Full  -  ( 10 Apr 2022 14:52 )  WBC Count : 5.68 K/uL  RBC Count : 4.32 M/uL  Hemoglobin : 12.9 g/dL  Hematocrit : 38.2 %  Platelet Count - Automated : 215 K/uL  Mean Cell Volume : 88.4 fl  Mean Cell Hemoglobin : 29.9 pg  Mean Cell Hemoglobin Concentration : 33.8 gm/dL  Auto Neutrophil # : 3.50 K/uL  Auto Lymphocyte # : 1.61 K/uL  Auto Monocyte # : 0.48 K/uL  Auto Eosinophil # : 0.04 K/uL  Auto Basophil # : 0.04 K/uL  Auto Neutrophil % : 61.6 %  Auto Lymphocyte % : 28.3 %  Auto Monocyte % : 8.5 %  Auto Eosinophil % : 0.7 %  Auto Basophil % : 0.7 %    .		Differential:	[] Automated		[] Manual  Chemistry                        12.9   5.68  )-----------( 215      ( 10 Apr 2022 14:52 )             38.2     04-10    141  |  106  |  17  ----------------------------<  121<H>  3.4<L>   |  30  |  1.25    Ca    9.2      10 Apr 2022 14:52  Mg     1.8     04-10    TPro  7.4  /  Alb  3.8  /  TBili  0.8  /  DBili  x   /  AST  29  /  ALT  32  /  AlkPhos  116  04-10    LIVER FUNCTIONS - ( 10 Apr 2022 14:52 )  Alb: 3.8 g/dL / Pro: 7.4 gm/dL / ALK PHOS: 116 U/L / ALT: 32 U/L / AST: 29 U/L / GGT: x             EKG v paced 90  tele: NSR in 70s    RADIOLOGY RESULTS:    MEDICATIONS  (STANDING):  apixaban 5 milliGRAM(s) Oral every 12 hours  atorvastatin 80 milliGRAM(s) Oral at bedtime  loratadine 10 milliGRAM(s) Oral daily  magnesium sulfate  IVPB 1 Gram(s) IV Intermittent once  metoprolol tartrate 25 milliGRAM(s) Oral two times a day  pantoprazole    Tablet 40 milliGRAM(s) Oral before breakfast  potassium chloride    Tablet ER 40 milliEquivalent(s) Oral every 4 hours  sacubitril 24 mG/valsartan 26 mG 1 Tablet(s) Oral two times a day    MEDICATIONS  (PRN):

## 2022-04-10 NOTE — H&P ADULT - NSHPPHYSICALEXAM_GEN_ALL_CORE
PHYSICAL EXAM:    Daily Height in cm: 167.64 (10 Apr 2022 14:36)    Daily     Vital Signs Last 24 Hrs  T(C): 37 (10 Apr 2022 18:11), Max: 37 (10 Apr 2022 18:11)  T(F): 98.6 (10 Apr 2022 18:11), Max: 98.6 (10 Apr 2022 18:11)  HR: 74 (10 Apr 2022 18:11) (69 - 88)  BP: 104/61 (10 Apr 2022 18:11) (102/55 - 104/61)  BP(mean): 75 (10 Apr 2022 18:11) (67 - 82)  RR: 18 (10 Apr 2022 18:11) (18 - 18)  SpO2: 100% (10 Apr 2022 18:11) (99% - 100%)    Constitutional: Weak  appearing  HEENT: Atraumatic, BEATRIZ, Normal, No congestion  Respiratory: Breath Sounds normal, no rhonchi/wheeze  Cardiovascular: N S1S2;   Gastrointestinal: Abdomen soft, non tender, Bowel Sounds present  Extremities: No edema, peripheral pulses present  Neurological: AAO x 3, no gross focal motor deficits  Skin: Non cellulitic, no rash, ulcers  Lymph Nodes: No lymphadenopathy noted  Back: No CVA tenderness   Musculoskeletal: non tender  Breasts: Deferred  Genitourinary: deferred  Rectal: Deferred

## 2022-04-10 NOTE — ED PROVIDER NOTE - PROGRESS NOTE DETAILS
Kyle IRBY for Dr. Magallon  Case/EKG discussed with Dr. Loco, interventional cardiology on-call. Dr. Magallon sending EKG to him. Kyle Magallon   EKG analyzed by Dr. Loco: not an emergent cardiac cath candidate, believes likely paced rhythm. Agrees with plan for 2 troponins, discharge if negative and if pt stable/asymptomatic for close cardiology follow-up tomorrow. C MD Naun:  Initial troponin elevated.  Pt,  & Dr. Barajas aware of tele admission.  Pt w/o cp while in ED.

## 2022-04-10 NOTE — ED ADULT TRIAGE NOTE - CHIEF COMPLAINT QUOTE
Pt. to the ED BIB Spouse C/O Palpitations and being in Afib- Denies CP and SOB- Hx of CHF, Afib,, on Xarelto

## 2022-04-10 NOTE — ED PROVIDER NOTE - CARDIAC, MLM
Regular rate and rhythm. Normal radial pulse. Normal rate, irregularly irregular rhythm.  Normal radial pulse.

## 2022-04-10 NOTE — H&P ADULT - NSICDXPASTMEDICALHX_GEN_ALL_CORE_FT
PAST MEDICAL HISTORY:  CHF (congestive heart failure)     COVID-19     HLD (hyperlipidemia)     HTN (hypertension)

## 2022-04-10 NOTE — PATIENT PROFILE ADULT - FALL HARM RISK - UNIVERSAL INTERVENTIONS
Bed in lowest position, wheels locked, appropriate side rails in place/Call bell, personal items and telephone in reach/Instruct patient to call for assistance before getting out of bed or chair/Non-slip footwear when patient is out of bed/[place] to call system/Physically safe environment - no spills, clutter or unnecessary equipment/Purposeful Proactive Rounding/Room/bathroom lighting operational, light cord in reach

## 2022-04-10 NOTE — ED ADULT NURSE NOTE - NS ED NURSE REPORT GIVEN TO FT
Panel Management Review      Patient has the following on his problem list:     Hypertension   Last three blood pressure readings:  BP Readings from Last 3 Encounters:   04/11/18 151/87   04/04/18 136/88   12/27/16 136/86     Blood pressure: FAILED    HTN Guidelines:  Age 18-59 BP range:  Less than 140/90  Age 60-85 with Diabetes:  Less than 140/90  Age 60-85 without Diabetes:  less than 150/90      Composite cancer screening  Chart review shows that this patient is due/due soon for the following None  Summary:    Patient is due/failing the following:   BP CHECK    Action needed:   Patient needs nurse only appointment.    Type of outreach:    Phone, left message for patient to call back.     Questions for provider review:    None                                                                                                                                    Reina Saenz CMA     Chart routed to self.          
yuliana hassan 3n

## 2022-04-10 NOTE — H&P ADULT - ASSESSMENT
71/F with PMHx of A-Fib on Eliquis, CAD, MI in 2003, s/p PCI w stent placement, CHF, s/p AICD, HTN, HLD, COVID in 12/20 presents to the ED BIB spouse c/o palpitations. Pt states when she got up this morning at about 7 AM, she felt palpitations. Pt describes palpitations as heart racing and pounding sensation. Pt states she took her morning medications which partially resolved symptoms, but not completely.  No c/o cp/sob/n/v.     Trop mildly elevated 62.     Pt admitted with     1) palpitations from A fib + RVR :  observe on tele  now resolved  in NSR 70s  cont BB,   cardio eval in am  keep K more than 4 and Mg more than 2    2) Mildly elevated troponin: 62; likely demand ischemia  serial cardiac enz  cont plavix  cardio eval     3) Hypokalemia 3.4: replace  recheck in am    4) CAD: cont plavix, BB, statin    5) Hx of CHF: stable  cont Torsemide    6) DVT PPX: on Eliquis    poc discussed with pt, , team

## 2022-04-10 NOTE — ED PROVIDER NOTE - CLINICAL SUMMARY MEDICAL DECISION MAKING FREE TEXT BOX
72 y/o female with PMHx of A-Fib (on Xarelto), CAD, MI in 2003, s/p PCI w stent placement, CHF, s/p AICD, HTN, HLD, COVID in 12/20 presents to the ED BIB spouse c/o palpitations. Pt states when she got up this morning at about 7 AM, she felt palpitations. Pt describes as palpitations as heart racing and pounding sensation. Pt states she took her morning medications which partially resolved symptoms, but not completely. Pt last ate at about 8 AM this morning. Denies diaphoresis, chest pain, lightheadedness/dizziness, syncope, SOB, nausea, or any other symptoms. Pt tachycardic 120s upon ED arrival, but in 90s, +rate-controlled upon ED evaluation. Pt currently asymptomatic. Physical exam unremarkable. EKG unclear if paced rhythm vs. A-Fib with ?Max. Plan for EKG, CXR, cardiac labs, discuss EKG with interventional cardiologist Dr. Loco. Monitor, observe and reassess. 72 y/o female with PMHx of A-Fib (on Xarelto), CAD, MI in 2003, s/p PCI w stent placement, CHF, s/p AICD, HTN, HLD, COVID in 12/20 presents to the ED BIB spouse c/o palpitations. Pt states when she got up this morning at about 7 AM, she felt palpitations. Pt describes as palpitations as heart racing and pounding sensation. Pt states she took her morning medications which partially resolved symptoms, but not completely. Pt last ate at about 8 AM this morning. Denies diaphoresis, chest pain, lightheadedness/dizziness, syncope, SOB, nausea, or any other symptoms. Pt tachycardic 120s upon ED arrival, but in 90s, +rate-controlled upon ED evaluation. Pt currently asymptomatic. Physical exam unremarkable. EKG unclear if paced rhythm vs. A-Fib with ?Max. Plan for EKG, CXR, cardiac labs, discuss EKG with interventional cardiologist Dr. Loco. Monitor, observe and reassess. Pt took Plavix 75 and Xarelto 5 this AM. 72 y/o female with PMHx of A-Fib (on Xarelto), CAD, MI in 2003, s/p PCI w stent placement, CHF, s/p AICD, HTN, HLD, COVID in 12/20 presents to the ED BIB spouse c/o palpitations. Pt states when she got up this morning at about 7 AM, she felt palpitations. Pt describes as palpitations as heart racing and pounding sensation. Pt states she took her morning medications with partial symptomatic improvement but no resolution. Denies diaphoresis, chest pain, lightheadedness/dizziness, syncope, SOB, nausea, or any other symptoms. Pt in AF, + tachycardic 120s upon ED arrival, but in 90s rate-controlled upon ED evaluation. Pt currently asymptomatic. Physical exam unremarkable. EKG unclear if paced rhythm vs. A-Fib with ?RENNY. Plan for EKG, CXR, cardiac labs, discuss EKG with interventional cardiologist Dr. Loco. Monitor, observe and reassess. Pt took Plavix 75 and Xarelto 5 this AM.

## 2022-04-11 ENCOUNTER — TRANSCRIPTION ENCOUNTER (OUTPATIENT)
Age: 72
End: 2022-04-11

## 2022-04-11 VITALS
RESPIRATION RATE: 18 BRPM | TEMPERATURE: 98 F | OXYGEN SATURATION: 97 % | DIASTOLIC BLOOD PRESSURE: 59 MMHG | HEART RATE: 64 BPM | SYSTOLIC BLOOD PRESSURE: 107 MMHG

## 2022-04-11 DIAGNOSIS — I50.22 CHRONIC SYSTOLIC (CONGESTIVE) HEART FAILURE: ICD-10-CM

## 2022-04-11 DIAGNOSIS — I10 ESSENTIAL (PRIMARY) HYPERTENSION: ICD-10-CM

## 2022-04-11 DIAGNOSIS — E78.5 HYPERLIPIDEMIA, UNSPECIFIED: ICD-10-CM

## 2022-04-11 DIAGNOSIS — I48.0 PAROXYSMAL ATRIAL FIBRILLATION: ICD-10-CM

## 2022-04-11 LAB
ANION GAP SERPL CALC-SCNC: 4 MMOL/L — LOW (ref 5–17)
APPEARANCE UR: ABNORMAL
BILIRUB UR-MCNC: NEGATIVE — SIGNIFICANT CHANGE UP
BUN SERPL-MCNC: 16 MG/DL — SIGNIFICANT CHANGE UP (ref 7–23)
CALCIUM SERPL-MCNC: 9.2 MG/DL — SIGNIFICANT CHANGE UP (ref 8.5–10.1)
CHLORIDE SERPL-SCNC: 110 MMOL/L — HIGH (ref 96–108)
CO2 SERPL-SCNC: 30 MMOL/L — SIGNIFICANT CHANGE UP (ref 22–31)
COLOR SPEC: YELLOW — SIGNIFICANT CHANGE UP
CREAT SERPL-MCNC: 1.01 MG/DL — SIGNIFICANT CHANGE UP (ref 0.5–1.3)
DIFF PNL FLD: NEGATIVE — SIGNIFICANT CHANGE UP
EGFR: 60 ML/MIN/1.73M2 — SIGNIFICANT CHANGE UP
GLUCOSE SERPL-MCNC: 94 MG/DL — SIGNIFICANT CHANGE UP (ref 70–99)
GLUCOSE UR QL: NEGATIVE — SIGNIFICANT CHANGE UP
KETONES UR-MCNC: NEGATIVE — SIGNIFICANT CHANGE UP
LEUKOCYTE ESTERASE UR-ACNC: ABNORMAL
MAGNESIUM SERPL-MCNC: 2.5 MG/DL — SIGNIFICANT CHANGE UP (ref 1.6–2.6)
NITRITE UR-MCNC: NEGATIVE — SIGNIFICANT CHANGE UP
PH UR: 5 — SIGNIFICANT CHANGE UP (ref 5–8)
POTASSIUM SERPL-MCNC: 3.8 MMOL/L — SIGNIFICANT CHANGE UP (ref 3.5–5.3)
POTASSIUM SERPL-SCNC: 3.8 MMOL/L — SIGNIFICANT CHANGE UP (ref 3.5–5.3)
PROT UR-MCNC: NEGATIVE — SIGNIFICANT CHANGE UP
SODIUM SERPL-SCNC: 144 MMOL/L — SIGNIFICANT CHANGE UP (ref 135–145)
SP GR SPEC: 1.02 — SIGNIFICANT CHANGE UP (ref 1.01–1.02)
TROPONIN I, HIGH SENSITIVITY RESULT: 44.34 NG/L — SIGNIFICANT CHANGE UP
UROBILINOGEN FLD QL: NEGATIVE — SIGNIFICANT CHANGE UP

## 2022-04-11 PROCEDURE — 99239 HOSP IP/OBS DSCHRG MGMT >30: CPT

## 2022-04-11 RX ORDER — OMEGA-3 ACID ETHYL ESTERS 1 G
1 CAPSULE ORAL
Qty: 0 | Refills: 0 | DISCHARGE

## 2022-04-11 RX ORDER — CX-024414 0.2 MG/ML
0.5 INJECTION, SUSPENSION INTRAMUSCULAR
Qty: 0 | Refills: 0 | DISCHARGE

## 2022-04-11 RX ORDER — POTASSIUM CHLORIDE 20 MEQ
1 PACKET (EA) ORAL
Qty: 30 | Refills: 0
Start: 2022-04-11 | End: 2022-05-10

## 2022-04-11 RX ADMIN — APIXABAN 5 MILLIGRAM(S): 2.5 TABLET, FILM COATED ORAL at 09:04

## 2022-04-11 RX ADMIN — CLOPIDOGREL BISULFATE 75 MILLIGRAM(S): 75 TABLET, FILM COATED ORAL at 09:06

## 2022-04-11 RX ADMIN — Medication 40 MILLIEQUIVALENT(S): at 02:55

## 2022-04-11 RX ADMIN — Medication 25 MILLIGRAM(S): at 09:04

## 2022-04-11 RX ADMIN — Medication 20 MILLIGRAM(S): at 09:03

## 2022-04-11 RX ADMIN — LORATADINE 10 MILLIGRAM(S): 10 TABLET ORAL at 09:07

## 2022-04-11 RX ADMIN — PANTOPRAZOLE SODIUM 40 MILLIGRAM(S): 20 TABLET, DELAYED RELEASE ORAL at 06:25

## 2022-04-11 RX ADMIN — SACUBITRIL AND VALSARTAN 1 TABLET(S): 24; 26 TABLET, FILM COATED ORAL at 09:04

## 2022-04-11 NOTE — DISCHARGE NOTE PROVIDER - NSDCMRMEDTOKEN_GEN_ALL_CORE_FT
apixaban 5 mg oral tablet: 1 tab(s) orally every 12 hours  ascorbic acid 500 mg oral tablet: 1 tab(s) orally 2 times a day  atorvastatin 80 mg oral tablet: 1 tab(s) orally once a day  Centrum Silver oral tablet: 1 tab(s) orally once a day  cholecalciferol 50 mcg (2000 intl units) oral tablet: 1 tab(s) orally once a day  clopidogrel 75 mg oral tablet: 1 tab(s) orally once a day  Entresto 24 mg-26 mg oral tablet: 1 tab(s) orally 2 times a day  metoprolol tartrate 25 mg oral tablet: 1 tab(s) orally 2 times a day  omeprazole 40 mg oral delayed release capsule: 1 cap(s) orally once a day  Potassium Chloride (Eqv-Klor-Con 10) 10 mEq oral tablet, extended release: 1 tab(s) orally once a day   torsemide 20 mg oral tablet: 1 tab(s) orally once a day  ZyrTEC 10 mg oral tablet: 1 tab(s) orally once a day

## 2022-04-11 NOTE — DISCHARGE NOTE PROVIDER - CARE PROVIDER_API CALL
Paul Dodd J  CARDIOVASCULAR DISEASE  [address], [facility] [address] [facility] [address]  [place] [address]  Phone: [phone]  Fax: [phone]  Follow Up Time:

## 2022-04-11 NOTE — CONSULT NOTE ADULT - SUBJECTIVE AND OBJECTIVE BOX
CHIEF COMPLAINT:  Patient is a 71y old  Female who presents with a chief complaint of A fib, (10 Apr 2022 19:22)      HPI: 22:  71/F well known to me with PMHx of A-Fib on Eliquis, CAD, MI in , s/p PCI w stent placement, CHF, s/p AICD, HTN, HLD, COVID in  presents to the ED BIB spouse c/o palpitations. Pt states when she got up this morning at about 7 AM, she felt palpitations. Pt describes palpitations as heart racing and pounding sensation. Pt states she took her morning medications which partially resolved symptoms, but not completely. Pt last ate at about 8 AM this morning. No c/o cp/sob/n/v.   In the ER she was noted to be back in AF but spontaneously converted back to NSR as she did in 2021.  She is now asymptomatic and eager to go home.  1st Trop mildly elevated 62, but 2nd and 3rd Troponins were (-). K+ was a bit low and is being repleted.  Her last echo pn 21 showed LVEF=20-25% as before (see below).        PMHx:  PAST MEDICAL & SURGICAL HISTORY:  COVID-19  HTN (hypertension)  HLD (hyperlipidemia)  CHF (congestive heart failure)  Stented coronary artery  AICD (automatic cardioverter/defibrillator) present      FAMILY HISTORY:   FAMILY HISTORY:  FH: CAD (coronary artery disease) (Father)        ALLERGIES:  Allergies  No Known Drug Allergies  strawberry (Other)      REVIEW OF SYSTEMS:  10 point ROS was obtained  Pertinent positives and negatives are as above  All other review of systems is negative unless indicated above      Vital Signs Last 24 Hrs  T(C): 36.6 (10 Apr 2022 22:15), Max: 37 (10 Apr 2022 18:11)  T(F): 97.9 (10 Apr 2022 22:15), Max: 98.6 (10 Apr 2022 18:11)  HR: 76 (10 Apr 2022 22:15) (69 - 88)  BP: 129/67 (10 Apr 2022 22:15) (102/55 - 129/67)  BP(mean): 84 (10 Apr 2022 22:15) (67 - 84)  RR: 16 (10 Apr 2022 22:15) (16 - 18)  SpO2: 98% (10 Apr 2022 22:15) (98% - 100%)        PHYSICAL EXAM:   Constitutional: NAD, awake and alert, well-developed  HEENT: PERR, EOMI, Normal Hearing, MMM  Neck: Soft and supple, No LAD, No JVD  Respiratory: Breath sounds are clear bilaterally, No wheezing, rales or rhonchi, AICD in left chest wall  Cardiovascular: S1 and S2, regular rate and rhythm, soft MIKE at LLSB and base as before, no gallops or rubs  Gastrointestinal: Bowel Sounds present, soft, nontender, nondistended, no guarding, no rebound  Extremities: No peripheral edema  Vascular: 2+ peripheral pulses  Neurological: A/O x 3, no focal deficits  Musculoskeletal: 5/5 strength b/l upper and lower extremities  Skin: No rashes      MEDICATIONS  (STANDING):  apixaban 5 milliGRAM(s) Oral every 12 hours  atorvastatin 80 milliGRAM(s) Oral at bedtime  clopidogrel Tablet 75 milliGRAM(s) Oral daily  loratadine 10 milliGRAM(s) Oral daily  metoprolol tartrate 25 milliGRAM(s) Oral two times a day  pantoprazole    Tablet 40 milliGRAM(s) Oral before breakfast  sacubitril 24 mG/valsartan 26 mG 1 Tablet(s) Oral two times a day  torsemide 20 milliGRAM(s) Oral daily    MEDICATIONS  (PRN):      LABS: All Labs Reviewed:                        12.9   5.68  )-----------( 215      ( 10 Apr 2022 14:52 )             38.2     04-10    141  |  106  |  17  ----------------------------<  121<H>  3.4<L>   |  30  |  1.25    Ca    9.2      10 Apr 2022 14:52  Mg     1.8     -10    TPro  7.4  /  Alb  3.8  /  TBili  0.8  /  DBili  x   /  AST  29  /  ALT  32  /  AlkPhos  116  04-10    Troponin I, High Sensitivity (04.10.22 @ 23:13): 44.34  Troponin I, High Sensitivity (04.10.22 @ 18:48): 52.18  Troponin I, High Sensitivity (04.10.22 @ 14:52): 62.47    Serum Pro-Brain Natriuretic Peptide: 1462 pg/mL (-10 @ 14:52)    BLOOD CULTURES:   LIPID PROFILE     RADIOLOGY:      EK/10/22: AF with IVCD, VVI pacing, RBBB/LAD=> NSR    TELEMETRY:  AF=>NSR    ECHO:  21:  M-Mode Measurements (cm)   LVEDd: 6.41 cm            LVESd: 5.81 cm   IVSEd: 1.1 cm   LVPWd: 1.09 cm            AO Root Dimension: 3.1 cm                ACS: 1.9 cm                             LA: 5 cm  Doppler Measurements:   AV Velocity:143 cm/s               MV Peak E-Wave: 101 cm/s   AV Peak Gradient: 8.18 mmHg                                      MV Peak Gradient: 4.08 mmHg   TR Velocity:255 cm/s   TR Gradient:26.01 mmHg   Estimated RAP:5 mmHg   RVSP:31 mmHg    Findings  Mitral Valve   Fibrocalcific changes noted to the mitral valve leaflets with preserved leaflet excursion.   Moderate (2+) mitral regurgitation is present.    Aortic Valve   Fibrocalcific changes noted to the Aortic valve leaflets with preserved leaflet excursion.    Tricuspid Valve   Normal appearing tricuspid valve structure.   Moderate (2+) tricuspid valve regurgitation is present.   Mild pulmonary hypertension.    Pulmonic Valve   Pulmonic valve not well seen.   Trace to mild pulmonic valvular regurgitation is present.    Left Atrium   The left atrium is moderately dilated.    Left Ventricle   Estimated left ventricular ejection fraction is 20-25%.   The left ventricle is normal in wall thickness.   The left ventricle cavity is moderately dilated.   Severe, diffuse hypokinesis of the left ventricle is present.    Right Atrium   Normal appearing right atrium.    Right Ventricle   Normal appearing right ventricle structure and function.   A device wire is seen in the RV and RA.    Pericardial Effusion   No evidence of pericardial effusion.    Pleural Effusion   No evidence of pleural effusion.    Miscellaneous   IVC was not seen within the subcostal view.    Summary   Estimated left ventricular ejection fraction is 20-25%.   The left ventricle is normal in wall thickness.   The left ventricle cavity is moderately dilated.   Severe, diffuse hypokinesisof the left ventricle is present.   The left atrium is moderately dilated.   Normal appearing right ventricle structure and function.   A device wire is seen in the RV and RA.   Fibrocalcific changes noted to the Aortic valve leaflets with preserved leaflet excursion.   Fibrocalcific changes noted to the mitral valve leaflets with preserved leaflet excursion.   Moderate (2+) mitral regurgitation is present.   Normal appearing tricuspid valve structure.   Moderate (2+) tricuspid valve regurgitation is present.   Mild pulmonary hypertension.   Pulmonic valve not well seen.   Trace to mild pulmonic valvular regurgitation is present.    Signature   ----------------------------------------------------------------   Electronically signed by Marco Luu MD(Interpreting   physician) on 2021 09:42 PM   ----------------------------------------------------------------

## 2022-04-11 NOTE — DISCHARGE NOTE PROVIDER - HOSPITAL COURSE
71/F with PMHx of A-Fib on Eliquis, CAD, MI in 2003, s/p PCI w stent placement, CHF, s/p AICD, HTN, HLD, COVID in 12/20 presents to the ED BIB spouse c/o palpitations. Pt states when she got up this morning at about 7 AM, she felt palpitations. Pt describes palpitations as heart racing and pounding sensation. Pt states she took her morning medications which partially resolved symptoms, but not completely. Pt last ate at about 8 AM this morning. No c/o cp/sob/n/v.     # Paroxysmal Afib    pt; with recurrent PAF - now back in NSR   cont. eliquis and BB  cleared for discharge by cardiology, will folllow up outpatient with Dr. Dodd     #) Mildly elevated troponin due to demand ischemia in the setting of Paroxysmal Afib   Minimal bump in Troponin initially due to demand ischemia and no indication for a repeat cardiac cath at his time  cont plavix  follow up with cardiology outpatient     #Hypokalemia - repleted and resolved   add potassium 10 mEq po daily - D/W Dr. Dodd     # CAD: cont plavix, BB, statin    #  Hx of CHF: stable  cont Torsemide and entersto     Pt. is stable for discharge, will follow up with cardiology/PCP - Dr. Dodd.     PHYSICAL EXAM:  Vital Signs Last 24 Hrs  T(C): 36.7 (11 Apr 2022 08:29), Max: 37 (10 Apr 2022 18:11)  T(F): 98.1 (11 Apr 2022 08:29), Max: 98.6 (10 Apr 2022 18:11)  HR: 64 (11 Apr 2022 08:29) (64 - 88)  BP: 107/59 (11 Apr 2022 08:29) (102/55 - 129/67)  BP(mean): 84 (10 Apr 2022 22:15) (67 - 84)  RR: 18 (11 Apr 2022 08:29) (16 - 18)  SpO2: 97% (11 Apr 2022 08:29) (97% - 100%)  Constitutional: Weak  appearing  HEENT: Atraumatic, BEATRIZ, Normal, No congestion  Respiratory: Breath Sounds normal, no rhonchi/wheeze  Cardiovascular: N S1S2;   Gastrointestinal: Abdomen soft, non tender, Bowel Sounds present  Extremities: No edema, peripheral pulses present  Neurological: AAO x 3, no gross focal motor deficits  Skin: Non cellulitic, no rash, ulcers  Lymph Nodes: No lymphadenopathy noted  Back: No CVA tenderness   Musculoskeletal: non tender  Breasts: Deferred  Genitourinary: deferred  Rectal: Deferred

## 2022-04-11 NOTE — DISCHARGE NOTE PROVIDER - NSDCHC_MEDRECSTATUS_GEN_ALL_CORE
Called pt no answer & unable to lvm. Scheduled pt a appt.  Waiting for him to call back to see if he can make it today Admission Reconciliation is Completed  Discharge Reconciliation is Completed

## 2022-04-11 NOTE — CONSULT NOTE ADULT - ASSESSMENT
4/11/22:  Pt with above history with recurrent PAF but back in NSR and feeling good.  Minimal bump in Troponin initially due to demand ischemia and no indication for a repeat cardiac cath at his time.  K+ being repleted and is back in NSR.  Ok for discharge home if ok with medicine and can follow up in my office next week.  Would add low dose KCl 10 meq daily and will monitor labs as an outpt.  Will follow.

## 2022-04-11 NOTE — DISCHARGE NOTE PROVIDER - NSDCCPCAREPLAN_GEN_ALL_CORE_FT
PRINCIPAL DISCHARGE DIAGNOSIS  Diagnosis: Atrial fibrillation with rapid ventricular response  Assessment and Plan of Treatment: - cont. eliquis and metoprolol as prescribed  - follow up with your cardiologist - Dr. Dodd in one week      SECONDARY DISCHARGE DIAGNOSES  Diagnosis: Hypokalemia  Assessment and Plan of Treatment: take potassium chloride 10 mEQ orally onece a day   check your potassium levels with your caduiologist/PCP in one week

## 2022-04-15 DIAGNOSIS — Z86.16 PERSONAL HISTORY OF COVID-19: ICD-10-CM

## 2022-04-15 DIAGNOSIS — E78.5 HYPERLIPIDEMIA, UNSPECIFIED: ICD-10-CM

## 2022-04-15 DIAGNOSIS — Z79.02 LONG TERM (CURRENT) USE OF ANTITHROMBOTICS/ANTIPLATELETS: ICD-10-CM

## 2022-04-15 DIAGNOSIS — Z95.810 PRESENCE OF AUTOMATIC (IMPLANTABLE) CARDIAC DEFIBRILLATOR: ICD-10-CM

## 2022-04-15 DIAGNOSIS — I25.10 ATHEROSCLEROTIC HEART DISEASE OF NATIVE CORONARY ARTERY WITHOUT ANGINA PECTORIS: ICD-10-CM

## 2022-04-15 DIAGNOSIS — I24.8 OTHER FORMS OF ACUTE ISCHEMIC HEART DISEASE: ICD-10-CM

## 2022-04-15 DIAGNOSIS — I48.0 PAROXYSMAL ATRIAL FIBRILLATION: ICD-10-CM

## 2022-04-15 DIAGNOSIS — I25.2 OLD MYOCARDIAL INFARCTION: ICD-10-CM

## 2022-04-15 DIAGNOSIS — Z91.018 ALLERGY TO OTHER FOODS: ICD-10-CM

## 2022-04-15 DIAGNOSIS — Z95.5 PRESENCE OF CORONARY ANGIOPLASTY IMPLANT AND GRAFT: ICD-10-CM

## 2022-04-15 DIAGNOSIS — Z79.01 LONG TERM (CURRENT) USE OF ANTICOAGULANTS: ICD-10-CM

## 2022-04-15 DIAGNOSIS — I11.0 HYPERTENSIVE HEART DISEASE WITH HEART FAILURE: ICD-10-CM

## 2022-04-15 DIAGNOSIS — E87.6 HYPOKALEMIA: ICD-10-CM

## 2022-04-15 DIAGNOSIS — I50.22 CHRONIC SYSTOLIC (CONGESTIVE) HEART FAILURE: ICD-10-CM

## 2022-08-05 NOTE — CDI QUERY NOTE - NSCDINOTEDOCCLARIFICATION_GEN_A_CORE
PLEASE INCLUDE MORE SPECIFIC DOCUMENTATION IN YOUR PROGRESS NOTE AND DISCHARGE SUMMARY.  The documentation in this patient's medical record requires additional clarification to ensure that we accurately capture the patients diagnosis(es), treatment and/or severity of illness. Please document to the greatest level of specificity all corresponding diagnoses (either known or suspected) and/or treatment associated with the clinical information described below. Dressing (No Sutures): pressure dressing

## 2022-09-06 ENCOUNTER — APPOINTMENT (OUTPATIENT)
Dept: ELECTROPHYSIOLOGY | Facility: CLINIC | Age: 72
End: 2022-09-06

## 2022-09-06 ENCOUNTER — NON-APPOINTMENT (OUTPATIENT)
Age: 72
End: 2022-09-06

## 2022-09-06 VITALS
OXYGEN SATURATION: 100 % | BODY MASS INDEX: 26.52 KG/M2 | SYSTOLIC BLOOD PRESSURE: 123 MMHG | WEIGHT: 165 LBS | RESPIRATION RATE: 14 BRPM | HEART RATE: 61 BPM | HEIGHT: 66 IN | DIASTOLIC BLOOD PRESSURE: 72 MMHG

## 2022-09-06 DIAGNOSIS — I25.5 ISCHEMIC CARDIOMYOPATHY: ICD-10-CM

## 2022-09-06 DIAGNOSIS — I48.0 PAROXYSMAL ATRIAL FIBRILLATION: ICD-10-CM

## 2022-09-06 PROCEDURE — 93282 PRGRMG EVAL IMPLANTABLE DFB: CPT

## 2022-09-08 ENCOUNTER — NON-APPOINTMENT (OUTPATIENT)
Age: 72
End: 2022-09-08

## 2022-09-08 ENCOUNTER — APPOINTMENT (OUTPATIENT)
Dept: ELECTROPHYSIOLOGY | Facility: CLINIC | Age: 72
End: 2022-09-08

## 2022-09-08 VITALS — DIASTOLIC BLOOD PRESSURE: 80 MMHG | HEART RATE: 69 BPM | SYSTOLIC BLOOD PRESSURE: 116 MMHG

## 2022-09-08 DIAGNOSIS — Z95.810 PRESENCE OF AUTOMATIC (IMPLANTABLE) CARDIAC DEFIBRILLATOR: ICD-10-CM

## 2022-09-08 DIAGNOSIS — I47.2 VENTRICULAR TACHYCARDIA: ICD-10-CM

## 2022-09-08 PROCEDURE — 93283 PRGRMG EVAL IMPLANTABLE DFB: CPT

## 2022-09-08 RX ORDER — METOPROLOL TARTRATE 25 MG/1
25 TABLET, FILM COATED ORAL TWICE DAILY
Qty: 180 | Refills: 2 | Status: ACTIVE | COMMUNITY
Start: 2022-09-08 | End: 1900-01-01

## 2023-11-30 NOTE — DISCHARGE NOTE NURSING/CASE MANAGEMENT/SOCIAL WORK - PATIENT PORTAL LINK FT
----- Message from Minnie Waddell LPN sent at 11/30/2023  2:25 PM CST -----    ----- Message -----  From: Minnie Waddell LPN  Sent: 11/30/2023   2:25 PM CST  To: [facility] Staff      ----- Message -----  From: Fozia Quarles  Sent: 11/30/2023   2:19 PM CST  To: [facility] Staff    The patient needs you to take [facility] out of her chart. She just wants [facility] in her chart. Just SKIP.  Pt's # [phone] or [phone] GH          You can access the FollowMyHealth Patient Portal offered by [facility] by registering at the following website: [email or web address]. By joining [employer]’s FollowMyHealth portal, you will also be able to view your health information using other applications (apps) compatible with our system.
